# Patient Record
Sex: FEMALE | Race: BLACK OR AFRICAN AMERICAN | NOT HISPANIC OR LATINO | Employment: PART TIME | URBAN - METROPOLITAN AREA
[De-identification: names, ages, dates, MRNs, and addresses within clinical notes are randomized per-mention and may not be internally consistent; named-entity substitution may affect disease eponyms.]

---

## 2017-01-13 ENCOUNTER — ROUTINE PRENATAL (OUTPATIENT)
Dept: OBSTETRICS AND GYNECOLOGY | Facility: CLINIC | Age: 22
End: 2017-01-13
Payer: MEDICAID

## 2017-01-13 VITALS
WEIGHT: 147.69 LBS | DIASTOLIC BLOOD PRESSURE: 60 MMHG | BODY MASS INDEX: 27.02 KG/M2 | SYSTOLIC BLOOD PRESSURE: 100 MMHG

## 2017-01-13 DIAGNOSIS — Z87.51 HISTORY OF PRETERM DELIVERY: ICD-10-CM

## 2017-01-13 DIAGNOSIS — O09.891 HISTORY OF GROUP B STREPTOCOCCUS URINARY TRACT INFECTION, CURRENTLY PREGNANT IN FIRST TRIMESTER: ICD-10-CM

## 2017-01-13 DIAGNOSIS — Z87.440 HISTORY OF GROUP B STREPTOCOCCUS URINARY TRACT INFECTION, CURRENTLY PREGNANT IN FIRST TRIMESTER: ICD-10-CM

## 2017-01-13 DIAGNOSIS — Z34.92 NORMAL PREGNANCY IN SECOND TRIMESTER: Primary | ICD-10-CM

## 2017-01-13 PROCEDURE — 99212 OFFICE O/P EST SF 10 MIN: CPT | Mod: TH,S$PBB,, | Performed by: OBSTETRICS & GYNECOLOGY

## 2017-01-13 PROCEDURE — 99212 OFFICE O/P EST SF 10 MIN: CPT | Mod: PBBFAC | Performed by: OBSTETRICS & GYNECOLOGY

## 2017-01-13 PROCEDURE — 99999 PR PBB SHADOW E&M-EST. PATIENT-LVL II: CPT | Mod: PBBFAC,,, | Performed by: OBSTETRICS & GYNECOLOGY

## 2017-01-13 NOTE — MR AVS SNAPSHOT
[facility] [address]  [address]  [address]  [facility] [address]  Phone: [phone]  Fax: [phone]                  Dannielle De Jesus   2017 2:00 PM   Routine Prenatal    Description:  Female : 1995   Provider:  Jessica Carrillo MD   Department:  [facility] [address]           Reason for Visit     Routine Prenatal Visit           Diagnoses this Visit        Comments    Normal pregnancy in first trimester    -  Primary     History of group B Streptococcus urinary tract infection, currently pregnant in first trimester         History of  delivery                To Do List           Goals (5 Years of Data)     None      [facility]     [facility] Care [facility] -  Assistance  Registered nurses in the [facility] provide clinical advisement, health education, appointment booking, and other advisory services.  Call for this free service at [phone].             Medications           Message regarding Medications     Verify the changes and/or additions to your medication regime listed below are the same as discussed with your clinician today.  If any of these changes or additions are incorrect, please notify your healthcare provider.             Verify that the below list of medications is an accurate representation of the medications you are currently taking.  If none reported, the list may be blank. If incorrect, please contact your healthcare provider. Carry this list with you in case of emergency.           Current Medications     ondansetron (ZOFRAN-ODT) 4 MG TbDL PLACE 1 TABLET UNDER TONGUE AND LET DISSOLVE EVERY 6 HOURS AS NEEDED FOR NAUSEA AND VOMITING           Clinical Reference Information           Prenatal Vitals     Enc. Date GA Prenatal Vitals Prenatal Pulse Pain Level Urine Albumin/Glucose Edema Presentation Dilation/Effacement/Station    17 23w4d 100/60 / 67 kg (147 lb 11.3 oz)  /  / Present  0 Negative /  "Negative None / None      16 17w4d 100/60 / 65 kg (143 lb 4.8 oz)  / +++ / Present  0 Negative / Negative None / None      16 16w0d 90/60 / 65 kg (143 lb 4.8 oz)  / +++  0 Negative / Negative None / None      10/25/16 12w1d 100/60 / 63 kg (138 lb 14.2 oz)  / +++  0 Negative / Negative None / None      16 8w1d 110/70 / 66 kg (145 lb 8.1 oz)              TW.228 kg (2 lb 11.3 oz)   Pregravid weight: 65.8 kg (145 lb)   Number of fetuses: 1   Height: 5' 3" (1.6 m)   BMI: 25.7       Vital Signs - Last Recorded  Most recent update: 2017  2:22 PM by Natty PINEDA Wt LMP BMI       100/60 67 kg (147 lb 11.3 oz) 2016 27.02 kg/m2       Allergies as of 2017     No Known Allergies      Immunizations Administered on Date of Encounter - 2017     None      "

## 2017-01-17 ENCOUNTER — LAB VISIT (OUTPATIENT)
Dept: LAB | Facility: OTHER | Age: 22
End: 2017-01-17
Attending: OBSTETRICS & GYNECOLOGY
Payer: MEDICAID

## 2017-01-17 DIAGNOSIS — Z34.92 NORMAL PREGNANCY IN SECOND TRIMESTER: ICD-10-CM

## 2017-01-17 LAB
BASOPHILS # BLD AUTO: 0.02 K/UL
BASOPHILS NFR BLD: 0.2 %
DIFFERENTIAL METHOD: ABNORMAL
EOSINOPHIL # BLD AUTO: 0.1 K/UL
EOSINOPHIL NFR BLD: 0.9 %
ERYTHROCYTE [DISTWIDTH] IN BLOOD BY AUTOMATED COUNT: 13.1 %
GLUCOSE SERPL-MCNC: 98 MG/DL
HCT VFR BLD AUTO: 31.6 %
HGB BLD-MCNC: 10.5 G/DL
LYMPHOCYTES # BLD AUTO: 1.4 K/UL
LYMPHOCYTES NFR BLD: 17.7 %
MCH RBC QN AUTO: 27 PG
MCHC RBC AUTO-ENTMCNC: 33.2 %
MCV RBC AUTO: 81 FL
MONOCYTES # BLD AUTO: 0.4 K/UL
MONOCYTES NFR BLD: 5.3 %
NEUTROPHILS # BLD AUTO: 6.1 K/UL
NEUTROPHILS NFR BLD: 75.7 %
PLATELET # BLD AUTO: 303 K/UL
PMV BLD AUTO: 9.4 FL
RBC # BLD AUTO: 3.89 M/UL
WBC # BLD AUTO: 8.08 K/UL

## 2017-01-17 PROCEDURE — 36415 COLL VENOUS BLD VENIPUNCTURE: CPT

## 2017-01-17 PROCEDURE — 82950 GLUCOSE TEST: CPT

## 2017-01-17 PROCEDURE — 85025 COMPLETE CBC W/AUTO DIFF WBC: CPT

## 2017-01-21 ENCOUNTER — PATIENT MESSAGE (OUTPATIENT)
Dept: OBSTETRICS AND GYNECOLOGY | Facility: CLINIC | Age: 22
End: 2017-01-21

## 2017-01-23 ENCOUNTER — TELEPHONE (OUTPATIENT)
Dept: OBSTETRICS AND GYNECOLOGY | Facility: CLINIC | Age: 22
End: 2017-01-23

## 2017-01-23 NOTE — TELEPHONE ENCOUNTER
I spoke with patient and told her per Dr Carrillo that she can take the cranberry tablets. Patient verbalized understanding

## 2017-01-30 ENCOUNTER — HOSPITAL ENCOUNTER (OUTPATIENT)
Facility: OTHER | Age: 22
Discharge: HOME | End: 2017-01-31
Attending: OBSTETRICS & GYNECOLOGY | Admitting: OBSTETRICS & GYNECOLOGY
Payer: MEDICAID

## 2017-01-30 ENCOUNTER — TELEPHONE (OUTPATIENT)
Dept: OBSTETRICS AND GYNECOLOGY | Facility: CLINIC | Age: 22
End: 2017-01-30

## 2017-01-30 DIAGNOSIS — O09.891 HISTORY OF GROUP B STREPTOCOCCUS URINARY TRACT INFECTION, CURRENTLY PREGNANT IN FIRST TRIMESTER: ICD-10-CM

## 2017-01-30 DIAGNOSIS — Z87.51 HISTORY OF PRETERM DELIVERY: ICD-10-CM

## 2017-01-30 DIAGNOSIS — O47.02 THREATENED PRETERM LABOR, SECOND TRIMESTER: ICD-10-CM

## 2017-01-30 DIAGNOSIS — Z87.440 HISTORY OF GROUP B STREPTOCOCCUS URINARY TRACT INFECTION, CURRENTLY PREGNANT IN FIRST TRIMESTER: ICD-10-CM

## 2017-01-30 DIAGNOSIS — O47.00 PRETERM UTERINE CONTRACTIONS: Primary | ICD-10-CM

## 2017-01-30 DIAGNOSIS — O26.872 SHORT CERVIX DURING PREGNANCY IN SECOND TRIMESTER: ICD-10-CM

## 2017-01-30 LAB
ABO + RH BLD: NORMAL
BASOPHILS # BLD AUTO: 0.01 K/UL
BASOPHILS NFR BLD: 0.1 %
BILIRUB SERPL-MCNC: NORMAL MG/DL
BLD GP AB SCN CELLS X3 SERPL QL: NORMAL
BLOOD URINE, POC: NORMAL
COLOR, POC UA: YELLOW
DIFFERENTIAL METHOD: ABNORMAL
EOSINOPHIL # BLD AUTO: 0.1 K/UL
EOSINOPHIL NFR BLD: 0.7 %
ERYTHROCYTE [DISTWIDTH] IN BLOOD BY AUTOMATED COUNT: 13.1 %
FIBRONECTIN FETAL SPEC QL: POSITIVE
GLUCOSE UR QL STRIP: NORMAL
HCT VFR BLD AUTO: 29.8 %
HGB BLD-MCNC: 10 G/DL
KETONES UR QL STRIP: NORMAL
LEUKOCYTE ESTERASE URINE, POC: NORMAL
LYMPHOCYTES # BLD AUTO: 1.8 K/UL
LYMPHOCYTES NFR BLD: 15.7 %
MCH RBC QN AUTO: 27.4 PG
MCHC RBC AUTO-ENTMCNC: 33.6 %
MCV RBC AUTO: 82 FL
MONOCYTES # BLD AUTO: 0.9 K/UL
MONOCYTES NFR BLD: 8.3 %
NEUTROPHILS # BLD AUTO: 8.3 K/UL
NEUTROPHILS NFR BLD: 75 %
NITRITE, POC UA: NORMAL
PH, POC UA: NORMAL
PLATELET # BLD AUTO: 265 K/UL
PMV BLD AUTO: 9 FL
PROTEIN, POC: NORMAL
RBC # BLD AUTO: 3.65 M/UL
SPECIFIC GRAVITY, POC UA: NORMAL
UROBILINOGEN, POC UA: NORMAL
WBC # BLD AUTO: 11.12 K/UL

## 2017-01-30 PROCEDURE — 86901 BLOOD TYPING SEROLOGIC RH(D): CPT

## 2017-01-30 PROCEDURE — 86900 BLOOD TYPING SEROLOGIC ABO: CPT

## 2017-01-30 PROCEDURE — 82731 ASSAY OF FETAL FIBRONECTIN: CPT

## 2017-01-30 PROCEDURE — G0378 HOSPITAL OBSERVATION PER HR: HCPCS

## 2017-01-30 PROCEDURE — 99285 EMERGENCY DEPT VISIT HI MDM: CPT | Mod: 25,,, | Performed by: OBSTETRICS & GYNECOLOGY

## 2017-01-30 PROCEDURE — 85025 COMPLETE CBC W/AUTO DIFF WBC: CPT

## 2017-01-30 PROCEDURE — 99285 EMERGENCY DEPT VISIT HI MDM: CPT | Mod: 25

## 2017-01-30 PROCEDURE — 59025 FETAL NON-STRESS TEST: CPT

## 2017-01-30 PROCEDURE — 59025 FETAL NON-STRESS TEST: CPT | Mod: 26,,, | Performed by: OBSTETRICS & GYNECOLOGY

## 2017-01-30 PROCEDURE — 76805 OB US >/= 14 WKS SNGL FETUS: CPT | Mod: 26,,, | Performed by: OBSTETRICS & GYNECOLOGY

## 2017-01-30 RX ORDER — AMOXICILLIN 250 MG
1 CAPSULE ORAL NIGHTLY PRN
Status: DISCONTINUED | OUTPATIENT
Start: 2017-01-30 | End: 2017-01-31 | Stop reason: HOSPADM

## 2017-01-30 RX ORDER — SIMETHICONE 80 MG
1 TABLET,CHEWABLE ORAL EVERY 6 HOURS PRN
Status: DISCONTINUED | OUTPATIENT
Start: 2017-01-30 | End: 2017-01-31 | Stop reason: HOSPADM

## 2017-01-30 RX ORDER — DIPHENHYDRAMINE HYDROCHLORIDE 50 MG/ML
25 INJECTION INTRAMUSCULAR; INTRAVENOUS EVERY 4 HOURS PRN
Status: DISCONTINUED | OUTPATIENT
Start: 2017-01-30 | End: 2017-01-31 | Stop reason: HOSPADM

## 2017-01-30 RX ORDER — DIPHENHYDRAMINE HCL 25 MG
25 CAPSULE ORAL EVERY 4 HOURS PRN
Status: DISCONTINUED | OUTPATIENT
Start: 2017-01-30 | End: 2017-01-31 | Stop reason: HOSPADM

## 2017-01-30 RX ORDER — ONDANSETRON 4 MG/1
8 TABLET, ORALLY DISINTEGRATING ORAL EVERY 8 HOURS PRN
Status: DISCONTINUED | OUTPATIENT
Start: 2017-01-30 | End: 2017-01-31 | Stop reason: HOSPADM

## 2017-01-30 RX ORDER — PRENATAL WITH FERROUS FUM AND FOLIC ACID 3080; 920; 120; 400; 22; 1.84; 3; 20; 10; 1; 12; 200; 27; 25; 2 [IU]/1; [IU]/1; MG/1; [IU]/1; MG/1; MG/1; MG/1; MG/1; MG/1; MG/1; UG/1; MG/1; MG/1; MG/1; MG/1
1 TABLET ORAL DAILY
Status: DISCONTINUED | OUTPATIENT
Start: 2017-01-31 | End: 2017-01-31 | Stop reason: HOSPADM

## 2017-01-30 RX ORDER — BETAMETHASONE SODIUM PHOSPHATE AND BETAMETHASONE ACETATE 3; 3 MG/ML; MG/ML
12 INJECTION, SUSPENSION INTRA-ARTICULAR; INTRALESIONAL; INTRAMUSCULAR; SOFT TISSUE ONCE
Status: DISCONTINUED | OUTPATIENT
Start: 2017-01-30 | End: 2017-01-30

## 2017-01-30 NOTE — H&P
History          Chief Complaint   Patient presents with    Contractions      Review of patient's allergies indicates:  No Known Allergies  HPI Comments: Dannielle De Jesus is a 21 y.o. [id number] at 26w0d presents complaining of contractions.      This IUP is complicated by history of PTD @ 36w. Currently on Muscoda weekly (R or F each week). Also with GBS UTI this pregnancy (will need PCN ppx during labor). Patient reports contractions, denies vaginal bleeding, denies LOF. Fetal Movement: normal.      The history is provided by the patient.      No past medical history on file.       Past Medical History Pertinent Negatives   Diagnosis Date Noted    Asthma 2015    Diabetes mellitus 2015    Hypertension 2015      No past surgical history on file.  History reviewed. No pertinent family history.       Social History   Substance Use Topics    Smoking status: Never Smoker    Smokeless tobacco: None    Alcohol use Yes      Review of Systems   Constitutional: Negative for fever.   HENT: Negative for sore throat.   Respiratory: Negative for shortness of breath.   Cardiovascular: Negative for chest pain.   Gastrointestinal: Negative for abdominal pain, constipation, diarrhea, nausea and vomiting.   Genitourinary: Negative for dysuria, vaginal bleeding, vaginal discharge and vaginal pain.   Musculoskeletal: Negative for back pain.   Skin: Negative for rash.   Neurological: Negative for dizziness, weakness, light-headedness and headaches.   Hematological: Does not bruise/bleed easily.                 Physical Exam          Initial Vitals   BP Pulse Resp Temp SpO2   17 1001 17 1001 17 1001 17 1000 17 1001   108/61 86 16 97.3 °F (36.3 °C) 100 %      Physical Exam  Nursing note and vitals reviewed.  Constitutional: She appears well-developed and well-nourished. She is not diaphoretic. No distress.   HENT:   Head: Normocephalic and atraumatic.   Eyes: Conjunctivae and EOM are  normal.   Neck: Normal range of motion.   Cardiovascular: Normal rate.   Pulmonary/Chest: No respiratory distress.   Musculoskeletal: Normal range of motion.   Neurological: She is alert and oriented to person, place, and time.   Skin: Skin is warm and dry.   Psychiatric: She has a normal mood and affect.   OB LABOR EXAM:      Membranes ruptured: No.   Method: Sterile speculum exam per MD.   Vaginal Bleeding: none present.      Dilatation: 0.      Amniotic Fluid Color: no fluid.      Comments: FHT: reassuring, 140 bpm, mod BTB variability, + accels, - decels  TOCO: difficult to determine on TOCO, but few irregular contractions  Cervix: closed, -3, medium consistency, posterior; Fung 2         ED Course   Procedures        Labs Reviewed   FETAL FIBRONECTIN - Abnormal; Notable for the following:    Result Value      Fetal Fibronectin Positive (*)       All other components within normal limits   POCT URINALYSIS, DIPSTICK OR TABLET REAGENT, AUTOMATED, WITH MICROSCOP            Medical Decision Making:   Initial Assessment:   Dannielle De Jesus is a 21 y.o. With history of PTD at 36 weeks, currently on sunni injections weekly.  Differential Diagnosis:   Threatened  labor   contractions     Clinical Tests:   Lab Tests: Ordered and Reviewed  <> Summary of Lab: FFN positive  ED Management:  History of  labor at 36w:  - PNT with ctx q2-3 minutes  - now in KIRTI ctx spaced to >7 minutes  - MFM TVUS for cervical length: 2.33 cm in length  - FFN is positive  - After 2 hours of monitoring patient is no longer ora on TOCO or feeling ctx  - Case discussed with MFM on call: will admit to Observation and reassess this evening v tomorrow  - Holding BMZ unless patient begins ora/threatens labor     Plan following discharge from this hospitalization:  - RTC for Sunni weekly as scheduled  - RTC for prenatal care as scheduled  - no s/s/ of PTL at this time  - RT KIRTI for ctx, decreased FM,  vaginal bleeding, LOF  - Pt will be given routine pregnancy instructions including to return to triage if she had any vaginal bleeding (other than spotting for the next 48hrs), any loss of fluid like her water broke, decreased fetal movement, or contractions Q 5min lasting for 2 or more hours. Pt will be also instructed to drink copious water.      Jeni Mei MD, PhD  OBGYN, PGY-1     Other:   I have discussed this case with another health care provider.  <> Summary of the Discussion: Plan discussed with staff physician Dr. Suárez                    ED Course      Clinical Impression:   The primary encounter diagnosis was  uterine contractions. Diagnoses of History of  delivery and History of GBS UTI at outside facility; PCN in labor were also pertinent to this visit.        Jeni Mei MD  Resident  [phone]

## 2017-01-30 NOTE — TELEPHONE ENCOUNTER
----- Message from Karolinamiroslava Hardy sent at 1/30/2017  8:20 AM CST -----  Contact: ALEXIS COFFMAN [[id number]]  _x  1st Request  _  2nd Request  _  3rd Request        Who: ALEXIS COFFMAN [[id number]]    Why: patient states 26 wks she thinks she is having lianet mccarty contractions and would like a call back from the staff to discuss.     What Number to Call Back: [phone]    When to Expect a call back: (Before the end of the day)   -- if call after 3:00 call back will be tomorrow.

## 2017-01-30 NOTE — IP AVS SNAPSHOT
[facility] ([facility])  [address] [place], [place] [id number]  Phone: [phone]           Patient Discharge Instructions     Our goal is to set you up for success. This packet includes information on your condition, medications, and your home care. It will help you to care for yourself so you don't get sicker and need to go back to the hospital.     Please ask your nurse if you have any questions.        There are many details to remember when preparing to leave the hospital. Here is what you will need to do:    1. Take your medicine. If you are prescribed medications, review your Medication List in the following pages. You may have new medications to  at the pharmacy and others that you'll need to stop taking. Review the instructions for how and when to take your medications. Talk with your doctor or nurses if you are unsure of what to do.     2. Go to your follow-up appointments. Specific follow-up information is listed in the following pages. Your may be contacted by a transition nurse or clinical provider about future appointments. Be sure we have all of the phone numbers to reach you, if needed. Please contact your provider's office if you are unable to make an appointment.     3. Watch for warning signs. Your doctor or nurse will give you detailed warning signs to watch for and when to call for assistance. These instructions may also include educational information about your condition. If you experience any of warning signs to your health, call your doctor.               [facility]  Unless otherwise directed by your provider, please contact [facility], our nurse care line that is available for 24/7 assistance.     [phone] (toll-free)    Registered nurses in the [facility] provide clinical advisement, health education, appointment booking, and other advisory services.                    ** Verify the list of medication(s) below is accurate and up to  date. Carry this with you in case of emergency. If your medications have changed, please notify your healthcare provider.             Medication List      CONTINUE taking these medications        Additional Info                      prenatal #108-iron,carbonyl-FA 30-1 mg Tab   Refills:  0    Instructions:  Take by mouth once daily.     Begin Date    AM    Noon    PM    Bedtime                  Please bring to all follow up appointments:    1. A copy of your discharge instructions.  2. All medicines you are currently taking in their original bottles.  3. Identification and insurance card.    Please arrive 15 minutes ahead of scheduled appointment time.    Please call 24 hours in advance if you must reschedule your appointment and/or time.        Your Scheduled Appointments     Feb 10, 2017  3:00 PM CST   Routine Prenatal Visit with Jessica Carrillo MD   [facility] [address] [facility] ([facility])    [address] [place] [address] [phone]              Follow-up Information     Follow up with Jessica Carrillo MD.    Specialty:  Obstetrics and Gynecology    Why:  prenatal appointment as scheduled    Contact information:    [address] [place] [address] [phone]          Discharge Instructions     Future Orders    Activity as tolerated     Call MD for:  difficulty breathing or increased cough     Call MD for:  increased confusion or weakness     Call MD for:  persistent dizziness, light-headedness, or visual disturbances     Call MD for:  persistent nausea and vomiting or diarrhea     Call MD for:  severe persistent headache     Call MD for:  severe uncontrolled pain     Call MD for:  temperature >100.4     Call MD for:     Comments:    Leakage of fluid, decreased fetal movement, vaginal bleeding, contractions every 3-5 minutes    Diet general     Questions:    Total calories:      Fat restriction, if any:      Protein restriction, if any:      Na restriction, if any:      Fluid  restriction:      Additional restrictions:          Discharge Instructions       Call clinic [phone] or L & D after hours at [phone] for vaginal bleeding, leakage of fluids, contractions 4-5 in one hour, decreased fetal movements (10 kicks in 2 hours), headache not relieved by Tylenol, blurry vision, or temp of 100.4 or greater.  Begin doing fetal kick counts, at least 10 movements in 2 hours starting at 28 weeks gestation.  Keep next clinic appointment.      Premature Labor    Premature labor (also called  labor) is when symptoms of labor occur before 37 weeks of pregnancy. (This is 3 weeks before your due date.) Premature labor can lead to premature delivery. This means giving birth to your baby early. Babies need at least 37 weeks of pregnancy for all the organs to develop normally. The earlier the delivery, the greater the risks to the baby.  In most cases, the cause of premature labor is unknown. But certain factors may make the problem more likely. These include:  · History of premature labor with other pregnancies  · Smoking  · Alcohol or substance abuse  · Low pre-pregnancy weight or weight gain during pregnancy  · Short time period between pregnancies  · Being pregnant with twins, triplets, or more  · History of certain types of surgery on the cervix or uterus  · Having a short cervix  · Certain infections  There are a number of other risk factors. Ask your healthcare provider to help you understand the risk factors specific to your case. Then find out what you can do to control or reduce them.  Contractions are one of the main signs of premature labor. A contraction is different from cramping. It may feel painful and the belly (abdomen) may get hard. It can last from a few seconds to a few minutes. Some women may feel only a sense of pressure in the belly, thighs, rectum, or vagina. Some may feel only the hardening of the uterus without pain or pressure. Or there may be a constant pain the lower  back, which spreads forward toward the belly.    Premature labor can often be treated with medicines. A hospital stay will likely be needed. If labor is stopped successfully and you and your baby are both healthy, you may be discharged to continue care at home.  Home care  · Ask your provider any questions you have. Be certain you understand how to care for yourself at home. Also follow all recommendations given by your healthcare providers.  · Learn the signs of premature labor. Watch for these signs when you get home.  · Limit or restrict activities as advised. This may include stopping certain physical activities and cutting back hours at work.  · Avoid doing any strenuous work. Ask family and friends for help with tasks and support at home, if needed.  · Dont smoke, drink alcohol, or use other harmful substances.  · Take steps to reduce stress.  · Report any unusual symptoms to your provider.  Follow-up care  Follow up with your healthcare provider, or as directed. Weekly visits with your provider may be needed.  When to seek medical advice  Call your healthcare provider right away if any of these occur:  · Regular or frequent contractions, whether they are painful or not  · Pressure in the pelvis  · Pressure in the lower belly or mild cramping in your belly with or without diarrhea  · Constant low, dull backache  · Gush or slow leaking of water from your vagina  · Change in vaginal discharge (watery, mucus, or bloody)  · Any vaginal bleeding  · Decreased movement of your baby  © 9263-4129 [employer]. [address] [place] [address], [place] [address]. All rights reserved. This information is not intended as a substitute for professional medical care. Always follow your healthcare professional's instructions.            Primary Diagnosis     Your primary diagnosis was:  Threatened Premature Labor      Admission Information     Date & Time Provider Department CSN    1/30/2017  9:45 AM Jessica Carrillo  "MD [facility] [id number]      Care Providers     Provider Role Specialty Primary office phone    Jessica Carrillo MD Attending Provider Obstetrics and Gynecology [phone]      Your Vitals Were     BP Pulse Temp Resp Height Weight    106/68 82 98 °F (36.7 °C) (Oral) 18 5' 3" (1.6 m) 65.8 kg (145 lb)    Last Period SpO2 BMI          08/01/2016 100% 25.69 kg/m2        Recent Lab Values     No lab values to display.      Allergies as of 1/31/2017     No Known Allergies      Advance Directives     An advance directive is a document which, in the event you are no longer able to make decisions for yourself, tells your healthcare team what kind of treatment you do or do not want to receive, or who you would like to make those decisions for you.  If you do not currently have an advance directive, [facility] encourages you to create one.  For more information call:  [phone]), [phone] ([phone]),  or log on to [email or web address].        Language Assistance Services     ATTENTION: Language assistance services are available, free of charge. Please call [phone].      ATENCIÓN: Si habla español, tiene a kwong disposición servicios gratuitos de asistencia lingüística. Llame al [phone].     CHÚ Ý: N?u b?n nói Ti?ng Vi?t, có các d?ch v? h? tr? ngôn ng? mi?n phí dành cho b?n. G?i s? [phone].         [facility] complies with applicable Federal civil rights laws and does not discriminate on the basis of race, color, national origin, age, disability, or sex.        "

## 2017-01-30 NOTE — LETTER
January 31, 2017         [address]  [place] [address]  Phone: [phone]       Patient: Dannielle De Jesus   YOB: 1995  Date of Visit: 01/31/2017    To Whom It May Concern:    Dannielle was at [facility] from 1/30/2017 through 1/31/2017. She may return to work on 2/7/2017 with no restrictions. Please excuse her from any work missed from 1/30/2017 through 2/6/2017. If you have any questions or concerns, or if I can be of further assistance, please do not hesitate to contact me.    Sincerely,      Vivian Scott MD

## 2017-01-30 NOTE — TELEPHONE ENCOUNTER
I spoke with patient and she said her contractions are minutes apart she's heading to [place] right now.

## 2017-01-30 NOTE — ED PROVIDER NOTES
Encounter Date: 2017       History     Chief Complaint   Patient presents with    Contractions     Review of patient's allergies indicates:  No Known Allergies  HPI Comments: Dannielle De Jesus is a 21 y.o. [id number] at 26w0d presents complaining of contractions.     This IUP is complicated by history of PTD @ 36w. Currently on [place] weekly (R or F each week). Also with GBS UTI this pregnancy (will need PCN ppx during labor).  Patient reports contractions, denies vaginal bleeding, denies LOF.   Fetal Movement: normal.     The history is provided by the patient.     No past medical history on file.  Past Medical History Pertinent Negatives   Diagnosis Date Noted    Asthma 2015    Diabetes mellitus 2015    Hypertension 2015     No past surgical history on file.  History reviewed. No pertinent family history.  Social History   Substance Use Topics    Smoking status: Never Smoker    Smokeless tobacco: None    Alcohol use Yes     Review of Systems   Constitutional: Negative for fever.   HENT: Negative for sore throat.    Respiratory: Negative for shortness of breath.    Cardiovascular: Negative for chest pain.   Gastrointestinal: Negative for abdominal pain, constipation, diarrhea, nausea and vomiting.   Genitourinary: Negative for dysuria, vaginal bleeding, vaginal discharge and vaginal pain.   Musculoskeletal: Negative for back pain.   Skin: Negative for rash.   Neurological: Negative for dizziness, weakness, light-headedness and headaches.   Hematological: Does not bruise/bleed easily.       Physical Exam   Initial Vitals   BP Pulse Resp Temp SpO2   17 1001 17 1001 17 1001 17 1000 17 1001   108/61 86 16 97.3 °F (36.3 °C) 100 %     Physical Exam    Nursing note and vitals reviewed.  Constitutional: She appears well-developed and well-nourished. She is not diaphoretic. No distress.   HENT:   Head: Normocephalic and atraumatic.   Eyes: Conjunctivae and EOM are  normal.   Neck: Normal range of motion.   Cardiovascular: Normal rate.   Pulmonary/Chest: No respiratory distress.   Musculoskeletal: Normal range of motion.   Neurological: She is alert and oriented to person, place, and time.   Skin: Skin is warm and dry.   Psychiatric: She has a normal mood and affect.     OB LABOR EXAM:     Membranes ruptured: No.   Method: Sterile speculum exam per MD.   Vaginal Bleeding: none present.     Dilatation: 0.     Amniotic Fluid Color: no fluid.     Comments: FHT: reassuring, 140 bpm, mod BTB variability, + accels, - decels  TOCO: difficult to determine on TOCO, but few irregular contractions  Cervix: closed, -3, medium consistency, posterior; Fung 2       ED Course   Procedures  Labs Reviewed   FETAL FIBRONECTIN - Abnormal; Notable for the following:        Result Value    Fetal Fibronectin Positive (*)     All other components within normal limits   POCT URINALYSIS, DIPSTICK OR TABLET REAGENT, AUTOMATED, WITH MICROSCOP           Medical Decision Making:   Initial Assessment:   Dannielle De Jesus is a 21 y.o. With history of PTD at 36 weeks, currently on robin injections weekly.  Differential Diagnosis:   Threatened  labor   contractions    Clinical Tests:   Lab Tests: Ordered and Reviewed       <> Summary of Lab: FFN positive  ED Management:  History of  labor at 36w:  - PNT with ctx q2-3 minutes  - now in KIRTI ctx spaced to >7 minutes  - MFM TVUS for cervical length: 2.33 cm in length  - FFN is positive  - After 2 hours of monitoring patient is no longer ora on TOCO or feeling ctx  - Case discussed with MFM on call: will admit to Observation and reassess this evening v tomorrow  - Holding BMZ unless patient begins ora/threatens labor    Plan following discharge from this hospitalization:  - RTC for [place] weekly as scheduled  - RTC for prenatal care as scheduled  - no s/s/ of PTL at this time  - RT KIRTI for ctx, decreased FM, vaginal  bleeding, LOF  - Pt will be given routine pregnancy instructions including to return to triage if she had any vaginal bleeding (other than spotting for the next 48hrs), any loss of fluid like her water broke, decreased fetal movement, or contractions Q 5min  lasting for 2 or more hours. Pt will be also instructed to drink copious water.     Jeni Mei MD, PhD  OBGYN, PGY-1    Other:   I have discussed this case with another health care provider.       <> Summary of the Discussion: Plan discussed with staff physician Dr. Suárez              Attending Attestation:   Physician Attestation Statement for Resident:  As the supervising MD   Physician Attestation Statement: I have personally seen and examined this patient.   I agree with the above history. -:   As the supervising MD I agree with the above PE.    As the supervising MD I agree with the above treatment, course, plan, and disposition.   -:   NST  I independently reviewed the fetal non-stress test with the following interpretation:    140 BPM baseline    Variability: moderate    Accelerations: present    Decelerations: variable x1    Contractions: occasional    Category 1    Clinical Interpretation: age appropriate    Patient evaluated and found to be stable, agree with resident's assessment and plan to admit for observation and consideration of steroids.  I was personally present during the critical portions of the procedure(s) performed by the resident and was immediately available in the ED to provide services and assistance as needed during the entire procedure.  I have reviewed the following: old records at this facility.                    ED Course     Clinical Impression:   The primary encounter diagnosis was  uterine contractions. Diagnoses of History of  delivery and History of GBS UTI at outside facility; PCN in labor were also pertinent to this visit.          Jeni Mei MD  Resident  [phone]       Sherron SMALL  MD Kamini  01/30/17 3662

## 2017-01-31 VITALS
WEIGHT: 145 LBS | TEMPERATURE: 98 F | HEIGHT: 63 IN | DIASTOLIC BLOOD PRESSURE: 68 MMHG | RESPIRATION RATE: 18 BRPM | SYSTOLIC BLOOD PRESSURE: 106 MMHG | OXYGEN SATURATION: 100 % | HEART RATE: 82 BPM | BODY MASS INDEX: 25.69 KG/M2

## 2017-01-31 PROCEDURE — 99224 PR SUBSEQUENT OBSERVATION CARE,LEVEL I: CPT | Mod: ,,, | Performed by: OBSTETRICS & GYNECOLOGY

## 2017-01-31 PROCEDURE — G0378 HOSPITAL OBSERVATION PER HR: HCPCS

## 2017-01-31 NOTE — DISCHARGE SUMMARY
[facility]  Discharge Summary  Gynecology      Admit Date: 2017    Discharge Date and Time: 2017 4:58 AM    Attending Physician: Jessica Carrillo MD ; Pasha De Jesus MD    Discharge Provider: Nathalie Herrera    Reason for Admission: Threatened  labor in the setting of h/o PTD    Procedures Performed: * No surgery found *    Hospital Course: Dannielle De Jesus is a 21 y.o. female at 26w1d who is admitted for monitoring 2/2 threatened  labor in the setting of h/o PTD @ 36wga ([place] weekly). FFN was positive. Patient was acontractile on toco prior to admission. MFM U/S noted cervical length of 2.33cm. HD#2, patient denied further contractions since admission and her cervical exam was unchanged (closed). Patient was subsequently discharged home with PTL precautions and instructions to return to clinic for PNC and [place] as scheduled.    Consults: none    Significant Diagnostic Studies: FFN+, TVUS w CL 2.33cm    Final Diagnoses:   Principal Problem: Threatened  labor   Secondary Diagnoses:   Active Hospital Problems    Diagnosis  POA    *Threatened  labor [O47.00]  Yes     uterine contractions [O47.9]  Yes      Resolved Hospital Problems    Diagnosis Date Resolved POA   No resolved problems to display.       Discharged Condition: good    Disposition: Home or Self Care    Follow Up/Patient Instructions:     Medications:  Reconciled Home Medications:   Current Discharge Medication List      CONTINUE these medications which have NOT CHANGED    Details   prenatal #108-iron,carbonyl-FA 30-1 mg Tab Take by mouth once daily.             Discharge Procedure Orders  Diet general     Activity as tolerated     Call MD for:  temperature >100.4     Call MD for:  persistent nausea and vomiting or diarrhea     Call MD for:  severe uncontrolled pain     Call MD for:  difficulty breathing or increased cough     Call MD for:  severe persistent headache     Call  MD for:  persistent dizziness, light-headedness, or visual disturbances     Call MD for:  increased confusion or weakness     Call MD for:   Order Comments: Leakage of fluid, decreased fetal movement, vaginal bleeding, contractions every 3-5 minutes       Follow-up Information     Follow up with Jessica Carrillo MD.    Specialty:  Obstetrics and Gynecology    Why:  prenatal appointment as scheduled    Contact information:    [address] [facility] [address]  [phone]          Denise Bradley MD  PGY-2 OB/GYN  [phone]    Nathalie Herrera M.D.  PGY-2 ObGyn  [phone]

## 2017-01-31 NOTE — PROGRESS NOTES
PROGRESS NOTE  ANTEPARTUM    Admit Date: 2017   LOS: 0 days     Reason for Admission:  Threatened  labor    SUBJECTIVE:     Dannielle De Jesus is a 21 y.o. female at 26w1d who is admitted for monitoring 2/2 threatened  labor in the setting of h/o PTD @ 36wga ([place] weekly).  This morning, patient reports resolution of contractions since admission. Patient denies contractions, loss of fluid, vaginal movement. Reports active fetal movement.    Scheduled Meds:   prenatal vitamin  1 tablet Oral Daily     Continuous Infusions:   PRN Meds:diphenhydrAMINE, diphenhydrAMINE, ondansetron, promethazine (PHENERGAN) IVPB, senna-docusate 8.6-50 mg, simethicone    Review of patient's allergies indicates:  No Known Allergies    OBJECTIVE:     Vital Signs (Most Recent)  Temp: 98.7 °F (37.1 °C) (17 2355)  Pulse: 81 (17 2355)  Resp: 18 (17 1615)  BP: 106/62 (17 2355)  SpO2: 100 % (17 2355)    Temperature Range Min/Max (Last 24H):  Temp:  [86.6 °F (30.3 °C)-98.7 °F (37.1 °C)]     Vital Signs Range (Last 24H):  Temp:  [86.6 °F (30.3 °C)-98.7 °F (37.1 °C)]   Pulse:  []   Resp:  [16-18]   BP: ()/(50-64)   SpO2:  [95 %-100 %]     I & O (Last 24H):No intake or output data in the 24 hours ending 17 5083    Physical Exam:  General: well developed, well nourished  Lungs:  normal respiratory effort and no increased work of breathing  Heart: regular rate  Abdomen: soft, nontender, nondistended  Pelvic:  closed, -3, medium consistency, posterior, Fung 2    Patient not yet monitored this morning      ASSESSMENT/PLAN:     Active Hospital Problems    Diagnosis  POA    *Threatened  labor [O47.00]  Yes     uterine contractions [O47.9]  Yes      Resolved Hospital Problems    Diagnosis Date Resolved POA   No resolved problems to display.       Assessment:    21 y.o. female at 26w1d who is admitted for monitoring 2/2 threatened  labor in the setting of h/o PTD  @ 36wga.    Plan:  1. Threatened  labor, h/o PTD   - This morning, patient reports no ctx since admission  - SVE unchanged and closed  - MFM TVUS yesterday for cervical length: 2.33 cm in length  - Will review AM NST/[place] and plan to discharge home today with PTL precautions   - RTC for [place] weekly as scheduled   - RTC for prenatal care as scheduled    Denise Bradley MD  PGY-2 OB/GYN  [phone]

## 2017-01-31 NOTE — PLAN OF CARE
Problem: Patient Care Overview  Goal: Plan of Care Review  Outcome: Ongoing (interventions implemented as appropriate)  Pt doing well, no acute changes during this shift, vital signs stable, no signs of distress, pt report positive fetal movement, pt denies contraction, vaginal bleeding, and LOF. Will continue to monitor patient.

## 2017-01-31 NOTE — DISCHARGE INSTRUCTIONS
Call clinic [phone] or L & D after hours at [phone] for vaginal bleeding, leakage of fluids, contractions 4-5 in one hour, decreased fetal movements (10 kicks in 2 hours), headache not relieved by Tylenol, blurry vision, or temp of 100.4 or greater.  Begin doing fetal kick counts, at least 10 movements in 2 hours starting at 28 weeks gestation.  Keep next clinic appointment.      Premature Labor    Premature labor (also called  labor) is when symptoms of labor occur before 37 weeks of pregnancy. (This is 3 weeks before your due date.) Premature labor can lead to premature delivery. This means giving birth to your baby early. Babies need at least 37 weeks of pregnancy for all the organs to develop normally. The earlier the delivery, the greater the risks to the baby.  In most cases, the cause of premature labor is unknown. But certain factors may make the problem more likely. These include:  · History of premature labor with other pregnancies  · Smoking  · Alcohol or substance abuse  · Low pre-pregnancy weight or weight gain during pregnancy  · Short time period between pregnancies  · Being pregnant with twins, triplets, or more  · History of certain types of surgery on the cervix or uterus  · Having a short cervix  · Certain infections  There are a number of other risk factors. Ask your healthcare provider to help you understand the risk factors specific to your case. Then find out what you can do to control or reduce them.  Contractions are one of the main signs of premature labor. A contraction is different from cramping. It may feel painful and the belly (abdomen) may get hard. It can last from a few seconds to a few minutes. Some women may feel only a sense of pressure in the belly, thighs, rectum, or vagina. Some may feel only the hardening of the uterus without pain or pressure. Or there may be a constant pain the lower back, which spreads forward toward the belly.    Premature labor can often be  treated with medicines. A hospital stay will likely be needed. If labor is stopped successfully and you and your baby are both healthy, you may be discharged to continue care at home.  Home care  · Ask your provider any questions you have. Be certain you understand how to care for yourself at home. Also follow all recommendations given by your healthcare providers.  · Learn the signs of premature labor. Watch for these signs when you get home.  · Limit or restrict activities as advised. This may include stopping certain physical activities and cutting back hours at work.  · Avoid doing any strenuous work. Ask family and friends for help with tasks and support at home, if needed.  · Dont smoke, drink alcohol, or use other harmful substances.  · Take steps to reduce stress.  · Report any unusual symptoms to your provider.  Follow-up care  Follow up with your healthcare provider, or as directed. Weekly visits with your provider may be needed.  When to seek medical advice  Call your healthcare provider right away if any of these occur:  · Regular or frequent contractions, whether they are painful or not  · Pressure in the pelvis  · Pressure in the lower belly or mild cramping in your belly with or without diarrhea  · Constant low, dull backache  · Gush or slow leaking of water from your vagina  · Change in vaginal discharge (watery, mucus, or bloody)  · Any vaginal bleeding  · Decreased movement of your baby  © 5758-1608 [employer]. [address] [place] [address], [place] [address]. All rights reserved. This information is not intended as a substitute for professional medical care. Always follow your healthcare professional's instructions.

## 2017-02-06 ENCOUNTER — TELEPHONE (OUTPATIENT)
Dept: OBSTETRICS AND GYNECOLOGY | Facility: CLINIC | Age: 22
End: 2017-02-06

## 2017-02-06 ENCOUNTER — ROUTINE PRENATAL (OUTPATIENT)
Dept: OBSTETRICS AND GYNECOLOGY | Facility: CLINIC | Age: 22
End: 2017-02-06
Payer: MEDICAID

## 2017-02-06 VITALS
WEIGHT: 152.13 LBS | SYSTOLIC BLOOD PRESSURE: 100 MMHG | BODY MASS INDEX: 26.95 KG/M2 | DIASTOLIC BLOOD PRESSURE: 60 MMHG

## 2017-02-06 DIAGNOSIS — Z87.51 HISTORY OF PRETERM DELIVERY: ICD-10-CM

## 2017-02-06 DIAGNOSIS — J11.1 FLU: ICD-10-CM

## 2017-02-06 DIAGNOSIS — Z34.92 NORMAL PREGNANCY IN SECOND TRIMESTER: Primary | ICD-10-CM

## 2017-02-06 PROCEDURE — 99999 PR PBB SHADOW E&M-EST. PATIENT-LVL II: CPT | Mod: PBBFAC,,, | Performed by: OBSTETRICS & GYNECOLOGY

## 2017-02-06 PROCEDURE — 99213 OFFICE O/P EST LOW 20 MIN: CPT | Mod: TH,S$PBB,, | Performed by: OBSTETRICS & GYNECOLOGY

## 2017-02-06 PROCEDURE — 99212 OFFICE O/P EST SF 10 MIN: CPT | Mod: PBBFAC | Performed by: OBSTETRICS & GYNECOLOGY

## 2017-02-06 RX ORDER — OSELTAMIVIR PHOSPHATE 75 MG/1
75 CAPSULE ORAL 2 TIMES DAILY
Qty: 10 CAPSULE | Refills: 0 | Status: SHIPPED | OUTPATIENT
Start: 2017-02-06 | End: 2017-02-11

## 2017-02-06 NOTE — TELEPHONE ENCOUNTER
----- Message from Natty Rabago sent at 2/6/2017  3:08 PM CST -----  Contact: Self      ----- Message -----     From: Dannielle Pierson     Sent: 2/6/2017   3:02 PM       To: Gerardo CARDOSO Staff    27 week ob pt is calling in regards of stating that she came back positive for the flu. The pt can be reached at [phone]. Thanks KG

## 2017-02-06 NOTE — PROGRESS NOTES
Patient has a sinus infection today.  Reports her whole family has had a viral infection. No fever at home, but has had body aches, headache, sweating in her sleep.  Advised to go to primary care to test for flu; monitor for fever, come to triage if symptoms worsen.   Recently hospitalized for concern for  labor.  FFN was positive.  Discharge home the next day  Positive fetal movement.  No contractions, vaginal bleeding, or loss of fluid.    Tdap next visit.

## 2017-02-06 NOTE — MR AVS SNAPSHOT
[facility] [address]  [address]  [address]  [facility] [place] [address]  Phone: [phone]  Fax: [phone]                  Dannielle De Jesus   2017 11:45 AM   Routine Prenatal    Description:  Female : 1995   Provider:  Jessica Carrillo MD   Department:  [facility] [address]           Reason for Visit     Routine Prenatal Visit                To Do List           Future Appointments        Provider Department Dept Phone    3/2/2017 2:45 PM Jessica Carrillo MD [facility] [address] [phone]      Goals (5 Years of Data)     None      [facility]     [facility] Care [facility] -  Assistance  Registered nurses in the [facility] provide clinical advisement, health education, appointment booking, and other advisory services.  Call for this free service at [phone].             Medications           Message regarding Medications     Verify the changes and/or additions to your medication regime listed below are the same as discussed with your clinician today.  If any of these changes or additions are incorrect, please notify your healthcare provider.             Verify that the below list of medications is an accurate representation of the medications you are currently taking.  If none reported, the list may be blank. If incorrect, please contact your healthcare provider. Carry this list with you in case of emergency.           Current Medications     prenatal #108-iron,carbonyl-FA 30-1 mg Tab Take by mouth once daily.           Clinical Reference Information           Prenatal Vitals     Enc. Date GA Prenatal Vitals Prenatal Pulse Pain Level Urine Albumin/Glucose Edema Presentation Dilation/Effacement/Station    17 27w0d 100/60 / 69 kg (152 lb 1.9 oz)  /  / Present  0 Negative / Negative None / None      17 26w0d Admission Dx:  uterine contractions Dept: [facility]    17 23w4d 100/60 / 67 kg (147 lb 11.3 oz) 22  "cm / +++ / Present  0 Negative / Negative None / None      12/2/16 17w4d 100/60 / 65 kg (143 lb 4.8 oz)  / +++ / Present  0 Negative / Negative None / None      11/21/16 16w0d 90/60 / 65 kg (143 lb 4.8 oz)  / +++  0 Negative / Negative None / None      10/25/16 12w1d 100/60 / 63 kg (138 lb 14.2 oz)  / +++  0 Negative / Negative None / None      9/27/16 8w1d 110/70 / 66 kg (145 lb 8.1 oz)              TWG: 3.228 kg (7 lb 1.9 oz)   Pregravid weight: 65.8 kg (145 lb)   Number of fetuses: 1   Height: 5' 3" (1.6 m)   BMI: 25.7       Your Vitals Were     BP Weight Last Period BMI       100/60 69 kg (152 lb 1.9 oz) 08/01/2016 26.95 kg/m2       Allergies as of 2/6/2017     No Known Allergies      Immunizations Administered on Date of Encounter - 2/6/2017     None      Language Assistance Services     ATTENTION: Language assistance services are available, free of charge. Please call [phone].      ATENCIÓN: Si habla anival, tiene a kwong disposición servicios gratuitos de asistencia lingüística. Llame al [phone].     [facility] Ý: N?u b?n nói Ti?ng Vi?t, có các d?ch v? h? tr? ngôn ng? mi?n phí dành cho b?n. G?i s? [phone].         [facility] [address] complies with applicable Federal civil rights laws and does not discriminate on the basis of race, color, national origin, age, disability, or sex.        "

## 2017-02-09 ENCOUNTER — TELEPHONE (OUTPATIENT)
Dept: OBSTETRICS AND GYNECOLOGY | Facility: CLINIC | Age: 22
End: 2017-02-09

## 2017-02-09 NOTE — TELEPHONE ENCOUNTER
Patient called to follow up.  She reports that she is feeling better from our last appointment on Monday.  She is taking tamiflu and has not had a fever.

## 2017-03-02 ENCOUNTER — ROUTINE PRENATAL (OUTPATIENT)
Dept: OBSTETRICS AND GYNECOLOGY | Facility: CLINIC | Age: 22
End: 2017-03-02
Payer: MEDICAID

## 2017-03-02 VITALS
SYSTOLIC BLOOD PRESSURE: 100 MMHG | BODY MASS INDEX: 27.22 KG/M2 | DIASTOLIC BLOOD PRESSURE: 60 MMHG | WEIGHT: 153.69 LBS

## 2017-03-02 DIAGNOSIS — Z34.93 NORMAL PREGNANCY IN THIRD TRIMESTER: Primary | ICD-10-CM

## 2017-03-02 DIAGNOSIS — N89.8 VAGINAL DISCHARGE: ICD-10-CM

## 2017-03-02 LAB
CANDIDA RRNA VAG QL PROBE: NEGATIVE
G VAGINALIS RRNA GENITAL QL PROBE: POSITIVE
T VAGINALIS RRNA GENITAL QL PROBE: NEGATIVE

## 2017-03-02 PROCEDURE — 99999 PR PBB SHADOW E&M-EST. PATIENT-LVL II: CPT | Mod: PBBFAC,,, | Performed by: OBSTETRICS & GYNECOLOGY

## 2017-03-02 PROCEDURE — 99212 OFFICE O/P EST SF 10 MIN: CPT | Mod: PBBFAC | Performed by: OBSTETRICS & GYNECOLOGY

## 2017-03-02 PROCEDURE — 99213 OFFICE O/P EST LOW 20 MIN: CPT | Mod: TH,S$PBB,, | Performed by: OBSTETRICS & GYNECOLOGY

## 2017-03-02 PROCEDURE — 87480 CANDIDA DNA DIR PROBE: CPT

## 2017-03-02 NOTE — MR AVS SNAPSHOT
[facility] [address]  [address]  [address]  [facility] [address]  Phone: [phone]  Fax: [phone]                  Dannielle De Jesus   3/2/2017 2:45 PM   Routine Prenatal    Description:  Female : 1995   Provider:  Jessica Carrillo MD   Department:  [facility] [address]           Reason for Visit     Routine Prenatal Visit                To Do List           Goals (5 Years of Data)     None      [facility]     [facility] -  Assistance  Registered nurses in the [facility] provide clinical advisement, health education, appointment booking, and other advisory services.  Call for this free service at [phone].             Medications           Message regarding Medications     Verify the changes and/or additions to your medication regime listed below are the same as discussed with your clinician today.  If any of these changes or additions are incorrect, please notify your healthcare provider.             Verify that the below list of medications is an accurate representation of the medications you are currently taking.  If none reported, the list may be blank. If incorrect, please contact your healthcare provider. Carry this list with you in case of emergency.           Current Medications     prenatal #108-iron,carbonyl-FA 30-1 mg Tab Take by mouth once daily.           Clinical Reference Information           Prenatal Vitals     Enc. Date GA Prenatal Vitals Prenatal Pulse Pain Level Urine Albumin/Glucose Edema Presentation Dilation/Effacement/Station    3/2/17 30w3d 100/60 / 69.7 kg (153 lb 10.6 oz)   0 Negative / Negative       17 27w0d 100/60 / 69 kg (152 lb 1.9 oz) 26 cm / +++ / Present  0 Negative / Negative None / None      17 26w0d Admission Dx:  uterine contractions Dept: [facility]    17 23w4d 100/60 / 67 kg (147 lb 11.3 oz) 22 cm / +++ / Present  0 Negative / Negative None / None       "12/2/16 17w4d 100/60 / 65 kg (143 lb 4.8 oz)  / +++ / Present  0 Negative / Negative None / None      11/21/16 16w0d 90/60 / 65 kg (143 lb 4.8 oz)  / +++  0 Negative / Negative None / None      10/25/16 12w1d 100/60 / 63 kg (138 lb 14.2 oz)  / +++  0 Negative / Negative None / None      9/27/16 8w1d 110/70 / 66 kg (145 lb 8.1 oz)              TWG: 3.928 kg (8 lb 10.6 oz)   Pregravid weight: 65.8 kg (145 lb)   Number of fetuses: 1   Height: 5' 3" (1.6 m)   BMI: 25.7       Your Vitals Were     BP                   100/60           Allergies as of 3/2/2017     No Known Allergies      Immunizations Administered on Date of Encounter - 3/2/2017     None      Language Assistance Services     ATTENTION: Language assistance services are available, free of charge. Please call [phone].      ATENCIÓN: Si habla español, tiene a kwong disposición servicios gratuitos de asistencia lingüística. Llame al [phone].     [facility] Ý: N?u b?n nói Ti?ng Vi?t, có các d?ch v? h? tr? ngôn ng? mi?n phí dành cho b?n. G?i s? [phone].         [facility] [address] complies with applicable Federal civil rights laws and does not discriminate on the basis of race, color, national origin, age, disability, or sex.        "

## 2017-03-02 NOTE — PROGRESS NOTES
Positive fetal movement.  No contractions, vaginal bleeding, or loss of fluid.  Occasional contractions.   Some vaginal discharge today.  Vaginosis screen today.    Recently had the flu. Tamiflu treated.  Patient is better but now has a cold.

## 2017-03-03 ENCOUNTER — PATIENT MESSAGE (OUTPATIENT)
Dept: OBSTETRICS AND GYNECOLOGY | Facility: CLINIC | Age: 22
End: 2017-03-03

## 2017-03-03 DIAGNOSIS — N76.0 BACTERIAL VAGINOSIS: Primary | ICD-10-CM

## 2017-03-03 DIAGNOSIS — B96.89 BACTERIAL VAGINOSIS: Primary | ICD-10-CM

## 2017-03-03 RX ORDER — METRONIDAZOLE 500 MG/1
500 TABLET ORAL EVERY 12 HOURS
Qty: 14 TABLET | Refills: 0 | Status: SHIPPED | OUTPATIENT
Start: 2017-03-03 | End: 2017-03-10

## 2017-03-07 ENCOUNTER — PATIENT MESSAGE (OUTPATIENT)
Dept: OBSTETRICS AND GYNECOLOGY | Facility: CLINIC | Age: 22
End: 2017-03-07

## 2017-03-07 NOTE — TELEPHONE ENCOUNTER
Spoke to patient. Pt states that she is having right leg pain. Pt denies swelling, tenderness to touch, chills, fever. Pt states heating pad is not working. Advised pt to monitor and if pain becomes worse or she experiences previously listed symptoms to go to OB ED for evaluation

## 2017-03-15 ENCOUNTER — CLINICAL SUPPORT (OUTPATIENT)
Dept: INFECTIOUS DISEASES | Facility: CLINIC | Age: 22
End: 2017-03-15
Payer: MEDICAID

## 2017-03-15 ENCOUNTER — ROUTINE PRENATAL (OUTPATIENT)
Dept: OBSTETRICS AND GYNECOLOGY | Facility: CLINIC | Age: 22
End: 2017-03-15
Payer: MEDICAID

## 2017-03-15 VITALS
SYSTOLIC BLOOD PRESSURE: 104 MMHG | WEIGHT: 157.44 LBS | DIASTOLIC BLOOD PRESSURE: 62 MMHG | BODY MASS INDEX: 27.88 KG/M2

## 2017-03-15 DIAGNOSIS — Z87.51 HISTORY OF PRETERM DELIVERY: Primary | ICD-10-CM

## 2017-03-15 DIAGNOSIS — Z34.93 NORMAL PREGNANCY IN THIRD TRIMESTER: ICD-10-CM

## 2017-03-15 PROCEDURE — 99213 OFFICE O/P EST LOW 20 MIN: CPT | Mod: TH,S$PBB,, | Performed by: OBSTETRICS & GYNECOLOGY

## 2017-03-15 PROCEDURE — 90715 TDAP VACCINE 7 YRS/> IM: CPT | Mod: PBBFAC

## 2017-03-15 PROCEDURE — 99999 PR PBB SHADOW E&M-EST. PATIENT-LVL II: CPT | Mod: PBBFAC,,, | Performed by: OBSTETRICS & GYNECOLOGY

## 2017-03-15 PROCEDURE — 90471 IMMUNIZATION ADMIN: CPT | Mod: PBBFAC

## 2017-03-15 PROCEDURE — 99999 PR PBB SHADOW E&M-EST. PATIENT-LVL I: CPT | Mod: PBBFAC,,,

## 2017-03-15 PROCEDURE — 99211 OFF/OP EST MAY X REQ PHY/QHP: CPT | Mod: PBBFAC,27,25

## 2017-03-15 NOTE — PROGRESS NOTES
Still having discharge.  No odor.  Was treated two weeks ago for BV.  Reports same discharge as before.  Is on the last day of flagyl. Will wait to see if symptoms improve.    Good fetal movement.  No contractions, no vaginal bleeding, and no loss of fluid.

## 2017-03-15 NOTE — MR AVS SNAPSHOT
[facility] - OB/GYN 5th Floor  [address]  [facility] [address]  Phone: [phone]                  Dannielle De Jesus   3/15/2017 2:00 PM   Routine Prenatal    Description:  Female : 1995   Provider:  Jessica Carrillo MD   Department:  [facility] - OB/GYN 5th Floor           Reason for Visit     Routine Prenatal Visit                To Do List           Future Appointments        Provider Department Dept Phone    3/15/2017 2:40 PM INJECTION, INFECTIOUS DISEASES [facility]- ID Injection Room [phone]    3/29/2017 1:00 PM Jessica Carrillo MD [facility] OB/GYN 5th Floor [phone]      Goals (5 Years of Data)     None      [facility]     [facility] On Call Nurse Care Line -  Assistance  Registered nurses in the [facility] On Call Center provide clinical advisement, health education, appointment booking, and other advisory services.  Call for this free service at [phone].             Medications           Message regarding Medications     Verify the changes and/or additions to your medication regime listed below are the same as discussed with your clinician today.  If any of these changes or additions are incorrect, please notify your healthcare provider.             Verify that the below list of medications is an accurate representation of the medications you are currently taking.  If none reported, the list may be blank. If incorrect, please contact your healthcare provider. Carry this list with you in case of emergency.           Current Medications     prenatal #108-iron,carbonyl-FA 30-1 mg Tab Take by mouth once daily.           Clinical Reference Information           Prenatal Vitals     Enc. Date GA Prenatal Vitals Prenatal Pulse Pain Level Urine Albumin/Glucose Edema Presentation Dilation/Effacement/Station    3/15/17 32w2d 104/62 / 71.4 kg (157 lb 6.5 oz)  /  / Present  0 Negative / Negative       3/2/17 30w3d 100/60 / 69.7 kg (153 lb 10.6 oz) 29 cm / +++ /  "Present  0 Negative / Negative None / None  0 / 40 / -3    17 27w0d 100/60 / 69 kg (152 lb 1.9 oz) 26 cm / +++ / Present  0 Negative / Negative None / None      17 26w0d Admission Dx:  uterine contractions Dept: [facility]    17 23w4d 100/60 / 67 kg (147 lb 11.3 oz) 22 cm / +++ / Present  0 Negative / Negative None / None      16 17w4d 100/60 / 65 kg (143 lb 4.8 oz)  / +++ / Present  0 Negative / Negative None / None      16 16w0d 90/60 / 65 kg (143 lb 4.8 oz)  / +++  0 Negative / Negative None / None      10/25/16 12w1d 100/60 / 63 kg (138 lb 14.2 oz)  / +++  0 Negative / Negative None / None      16 8w1d 110/70 / 66 kg (145 lb 8.1 oz)              TW.629 kg (12 lb 6.5 oz)   Pregravid weight: 65.8 kg (145 lb)   Number of fetuses: 1   Height: 5' 3" (1.6 m)   BMI: 25.7       Your Vitals Were     BP Weight Last Period BMI       104/62 71.4 kg (157 lb 6.5 oz) 2016 27.88 kg/m2       Allergies as of 3/15/2017     No Known Allergies      Immunizations Administered on Date of Encounter - 3/15/2017     None      Language Assistance Services     ATTENTION: Language assistance services are available, free of charge. Please call [phone].      ATENCIÓN: Si orinla daxzhane, tiene a kwong disposición servicios gratuitos de asistencia lingüística. Llame al [phone].     CHÚ Ý: N?u b?n nói Ti?ng Vi?t, có các d?ch v? h? tr? ngôn ng? mi?n phí dành cho b?n. G?i s? [phone].         [facility] - OB/GYN 5th Floor complies with applicable Federal civil rights laws and does not discriminate on the basis of race, color, national origin, age, disability, or sex.        "

## 2017-03-20 ENCOUNTER — PATIENT MESSAGE (OUTPATIENT)
Dept: OBSTETRICS AND GYNECOLOGY | Facility: CLINIC | Age: 22
End: 2017-03-20

## 2017-03-29 ENCOUNTER — ROUTINE PRENATAL (OUTPATIENT)
Dept: OBSTETRICS AND GYNECOLOGY | Facility: CLINIC | Age: 22
End: 2017-03-29
Payer: MEDICAID

## 2017-03-29 VITALS
SYSTOLIC BLOOD PRESSURE: 102 MMHG | WEIGHT: 155.63 LBS | BODY MASS INDEX: 27.57 KG/M2 | DIASTOLIC BLOOD PRESSURE: 72 MMHG

## 2017-03-29 DIAGNOSIS — Z34.93 NORMAL PREGNANCY IN THIRD TRIMESTER: ICD-10-CM

## 2017-03-29 DIAGNOSIS — Z87.51 HISTORY OF PRETERM DELIVERY: Primary | ICD-10-CM

## 2017-03-29 DIAGNOSIS — N89.8 VAGINAL DISCHARGE: ICD-10-CM

## 2017-03-29 PROCEDURE — 99213 OFFICE O/P EST LOW 20 MIN: CPT | Mod: TH,S$PBB,, | Performed by: OBSTETRICS & GYNECOLOGY

## 2017-03-29 PROCEDURE — 99999 PR PBB SHADOW E&M-EST. PATIENT-LVL II: CPT | Mod: PBBFAC,,, | Performed by: OBSTETRICS & GYNECOLOGY

## 2017-03-29 PROCEDURE — 87480 CANDIDA DNA DIR PROBE: CPT

## 2017-03-29 PROCEDURE — 99212 OFFICE O/P EST SF 10 MIN: CPT | Mod: PBBFAC | Performed by: OBSTETRICS & GYNECOLOGY

## 2017-03-31 ENCOUNTER — PATIENT MESSAGE (OUTPATIENT)
Dept: OBSTETRICS AND GYNECOLOGY | Facility: HOSPITAL | Age: 22
End: 2017-03-31

## 2017-03-31 DIAGNOSIS — N76.0 BV (BACTERIAL VAGINOSIS): Primary | ICD-10-CM

## 2017-03-31 DIAGNOSIS — B96.89 BV (BACTERIAL VAGINOSIS): Primary | ICD-10-CM

## 2017-03-31 RX ORDER — METRONIDAZOLE 7.5 MG/G
1 GEL VAGINAL NIGHTLY
Qty: 70 G | Refills: 0 | Status: SHIPPED | OUTPATIENT
Start: 2017-03-31 | End: 2017-04-07

## 2017-04-05 ENCOUNTER — ROUTINE PRENATAL (OUTPATIENT)
Dept: OBSTETRICS AND GYNECOLOGY | Facility: CLINIC | Age: 22
End: 2017-04-05
Payer: MEDICAID

## 2017-04-05 VITALS — BODY MASS INDEX: 27.73 KG/M2 | DIASTOLIC BLOOD PRESSURE: 60 MMHG | SYSTOLIC BLOOD PRESSURE: 114 MMHG | WEIGHT: 156.5 LBS

## 2017-04-05 DIAGNOSIS — D50.8 IRON DEFICIENCY ANEMIA SECONDARY TO INADEQUATE DIETARY IRON INTAKE: Primary | ICD-10-CM

## 2017-04-05 DIAGNOSIS — Z34.93 NORMAL PREGNANCY IN THIRD TRIMESTER: Primary | ICD-10-CM

## 2017-04-05 PROCEDURE — 99999 PR PBB SHADOW E&M-EST. PATIENT-LVL II: CPT | Mod: PBBFAC,,, | Performed by: OBSTETRICS & GYNECOLOGY

## 2017-04-05 PROCEDURE — 99212 OFFICE O/P EST SF 10 MIN: CPT | Mod: PBBFAC | Performed by: OBSTETRICS & GYNECOLOGY

## 2017-04-05 PROCEDURE — 99213 OFFICE O/P EST LOW 20 MIN: CPT | Mod: TH,S$PBB,, | Performed by: OBSTETRICS & GYNECOLOGY

## 2017-04-05 RX ORDER — FERROUS SULFATE 325(65) MG
325 TABLET ORAL 2 TIMES DAILY
Qty: 60 TABLET | Refills: 3 | Status: SHIPPED | OUTPATIENT
Start: 2017-04-05 | End: 2018-03-06

## 2017-04-06 ENCOUNTER — TELEPHONE (OUTPATIENT)
Dept: OBSTETRICS AND GYNECOLOGY | Facility: CLINIC | Age: 22
End: 2017-04-06

## 2017-04-06 NOTE — TELEPHONE ENCOUNTER
----- Message from Lissett Carvalho sent at 4/5/2017  1:28 PM CDT -----  Contact: pt   _X  1st Request  _  2nd Request  _  3rd Request        Who: ALEXIS COFFMAN [[id number]]    Why: pt is calling in regards to getting her [place] injection pt states she is 3cm and would like to knw if she needs to continue with the injections     What Number to Call Back: [phone].    When to Expect a call back: (Before the end of the day)   -- if the call is after 12:00, the call back will be tomorrow.

## 2017-04-11 ENCOUNTER — ROUTINE PRENATAL (OUTPATIENT)
Dept: OBSTETRICS AND GYNECOLOGY | Facility: CLINIC | Age: 22
End: 2017-04-11
Payer: MEDICAID

## 2017-04-11 VITALS
DIASTOLIC BLOOD PRESSURE: 64 MMHG | BODY MASS INDEX: 28.63 KG/M2 | WEIGHT: 161.63 LBS | SYSTOLIC BLOOD PRESSURE: 110 MMHG

## 2017-04-11 DIAGNOSIS — Z87.51 HISTORY OF PRETERM DELIVERY: Primary | ICD-10-CM

## 2017-04-11 DIAGNOSIS — Z34.93 NORMAL PREGNANCY IN THIRD TRIMESTER: ICD-10-CM

## 2017-04-11 PROCEDURE — 99213 OFFICE O/P EST LOW 20 MIN: CPT | Mod: TH,S$PBB,, | Performed by: OBSTETRICS & GYNECOLOGY

## 2017-04-11 PROCEDURE — 99999 PR PBB SHADOW E&M-EST. PATIENT-LVL II: CPT | Mod: PBBFAC,,, | Performed by: OBSTETRICS & GYNECOLOGY

## 2017-04-11 PROCEDURE — 99212 OFFICE O/P EST SF 10 MIN: CPT | Mod: PBBFAC | Performed by: OBSTETRICS & GYNECOLOGY

## 2017-04-11 NOTE — PROGRESS NOTES
Good fetal movement.  No contractions, no vaginal bleeding, and no loss of fluid.  Labor precautions.  Still complaining of vaginal discharge/irritation.  Complete vaginal suppositories.  Consider retest next week.

## 2017-04-11 NOTE — MR AVS SNAPSHOT
[facility] - OB/GYN [address]  [address]  [address]  [facility] [place] [address]  Phone: [phone]  Fax: [phone]                  Dannielle De Jesus   2017 3:00 PM   Routine Prenatal    Description:  Female : 1995   Provider:  Jessica Carrillo MD   Department:  [facility] - OB/GYN [address]           Reason for Visit     Routine Prenatal Visit                To Do List           Future Appointments        Provider Department Dept Phone    2017 2:00 PM Jessica Carrillo MD [facility] - OB/GYN [address] [phone]    2017 8:30 AM Jessica Carrillo MD [facility] - OB/GYN [address] [phone]      Goals (5 Years of Data)     None      [facility]     [facility] On Call Nurse Care Line -  Assistance  Unless otherwise directed by your provider, please contact [facility], our nurse care line that is available for  assistance.     Registered nurses in the [facility] Center provide: appointment scheduling, clinical advisement, health education, and other advisory services.  Call: [phone] (toll free)               Medications           Message regarding Medications     Verify the changes and/or additions to your medication regime listed below are the same as discussed with your clinician today.  If any of these changes or additions are incorrect, please notify your healthcare provider.             Verify that the below list of medications is an accurate representation of the medications you are currently taking.  If none reported, the list may be blank. If incorrect, please contact your healthcare provider. Carry this list with you in case of emergency.           Current Medications     ferrous sulfate 325 mg (65 mg iron) Tab tablet Take 1 tablet (325 mg total) by mouth 2 (two) times daily.    prenatal #108-iron,carbonyl-FA 30-1 mg Tab Take by mouth once daily.           Clinical Reference Information           Prenatal Vitals     Enc. Date GA Prenatal  "Vitals Prenatal Pulse Pain Level Urine Albumin/Glucose Edema Presentation Dilation/Effacement/Station    17 36w1d 110/64 / 73.3 kg (161 lb 9.6 oz)   5        17 35w2d 114/60 / 71 kg (156 lb 8.4 oz) 35 cm / +++ / Present  5 Negative / Negative None / None Vertex 3  40 / -1    3/29/17 34w2d 102/72 / 70.6 kg (155 lb 10.3 oz) 33 cm / +++ / Present  0 Trace / Negative None / None      3/15/17 32w2d 104/62 / 71.4 kg (157 lb 6.5 oz) 32 cm / +++ / Present  0 Negative / Negative None / None      3/2/17 30w3d 100/60 / 69.7 kg (153 lb 10.6 oz) 29 cm / +++ / Present  0 Negative / Negative None / None   / -3    17 27w0d 100/60 / 69 kg (152 lb 1.9 oz) 26 cm / +++ / Present  0 Negative / Negative None / None      17 26w0d Admission Dx:  uterine contractions Dept: [facility]    17 23w4d 100/60 / 67 kg (147 lb 11.3 oz) 22 cm / +++ / Present  0 Negative / Negative None / None      16 17w4d 100/60 / 65 kg (143 lb 4.8 oz)  / +++ / Present  0 Negative / Negative None / None      16 16w0d 90/60 / 65 kg (143 lb 4.8 oz)  / +++  0 Negative / Negative None / None      10/25/16 12w1d 100/60 / 63 kg (138 lb 14.2 oz)  / +++  0 Negative / Negative None / None      16 8w1d 110/70 / 66 kg (145 lb 8.1 oz)              TW.528 kg (16 lb 9.6 oz)   Pregravid weight: 65.8 kg (145 lb)   Number of fetuses: 1   Height: 5' 3" (1.6 m)   BMI: 25.7       Your Vitals Were     BP Weight Last Period BMI       110/64 73.3 kg (161 lb 9.6 oz) 2016 28.63 kg/m2       Allergies as of 2017     No Known Allergies      Immunizations Administered on Date of Encounter - 2017     None      Language Assistance Services     ATTENTION: Language assistance services are available, free of charge. Please call [phone].      ATENCIÓN: Si habla anival, tiene a kwong disposición servicios gratuitos de asistencia lingüística. Llame al [phone].     CHÚ Ý: N?u b?n nói Ti?ng Vi?t, có các d?ch v? h? tr? " rula wagner? mi?n phí dành cho b?n. G?i s? [phone].         [facility] - OB/GYN [address] complies with applicable Federal civil rights laws and does not discriminate on the basis of race, color, national origin, age, disability, or sex.

## 2017-04-20 ENCOUNTER — ROUTINE PRENATAL (OUTPATIENT)
Dept: OBSTETRICS AND GYNECOLOGY | Facility: CLINIC | Age: 22
End: 2017-04-20
Payer: MEDICAID

## 2017-04-20 VITALS
BODY MASS INDEX: 28.51 KG/M2 | DIASTOLIC BLOOD PRESSURE: 70 MMHG | SYSTOLIC BLOOD PRESSURE: 112 MMHG | WEIGHT: 160.94 LBS

## 2017-04-20 DIAGNOSIS — Z34.93 NORMAL PREGNANCY IN THIRD TRIMESTER: Primary | ICD-10-CM

## 2017-04-20 PROCEDURE — 99999 PR PBB SHADOW E&M-EST. PATIENT-LVL II: CPT | Mod: PBBFAC,,, | Performed by: OBSTETRICS & GYNECOLOGY

## 2017-04-20 PROCEDURE — 99213 OFFICE O/P EST LOW 20 MIN: CPT | Mod: TH,S$PBB,, | Performed by: OBSTETRICS & GYNECOLOGY

## 2017-04-20 NOTE — MR AVS SNAPSHOT
[facility] - OB/GYN [address]  [address]  [address]  [facility] [address]  Phone: [phone]  Fax: [phone]                  Dannielle De Jesus   2017 2:00 PM   Routine Prenatal    Description:  Female : 1995   Provider:  Jessica Carrillo MD   Department:  [facility] - OB/GYN [address]           Reason for Visit     Routine Prenatal Visit                To Do List           Future Appointments        Provider Department Dept Phone    2017 8:30 AM Jessica Carrillo MD [facility] - OB/GYN [address] [phone]      Goals (5 Years of Data)     None      [facility]     [facility] On Call Nurse Care Line -  Assistance  Unless otherwise directed by your provider, please contact [facility], our nurse care line that is available for  assistance.     Registered nurses in the [facility] On Call Center provide: appointment scheduling, clinical advisement, health education, and other advisory services.  Call: [phone] (toll free)               Medications           Message regarding Medications     Verify the changes and/or additions to your medication regime listed below are the same as discussed with your clinician today.  If any of these changes or additions are incorrect, please notify your healthcare provider.             Verify that the below list of medications is an accurate representation of the medications you are currently taking.  If none reported, the list may be blank. If incorrect, please contact your healthcare provider. Carry this list with you in case of emergency.           Current Medications     prenatal #108-iron,carbonyl-FA 30-1 mg Tab Take by mouth once daily.    ferrous sulfate 325 mg (65 mg iron) Tab tablet Take 1 tablet (325 mg total) by mouth 2 (two) times daily.           Clinical Reference Information           Prenatal Vitals     Enc. Date GA Prenatal Vitals Prenatal Pulse Pain Level Urine Albumin/Glucose Edema Presentation  "Dilation/Effacement/Station    17 37w3d 112/70 / 73 kg (160 lb 15 oz)           17 36w1d 110/64 / 73.3 kg (161 lb 9.6 oz) 36 cm / +++ / Present  5 Trace / Negative None / None Vertex 3 / 70 / -1    17 35w2d 114/60 / 71 kg (156 lb 8.4 oz) 35 cm / +++ / Present  5 Negative / Negative None / None Vertex 3 / 40 / -1    3/29/17 34w2d 102/72 / 70.6 kg (155 lb 10.3 oz) 33 cm / +++ / Present  0 Trace / Negative None / None      3/15/17 32w2d 104/62 / 71.4 kg (157 lb 6.5 oz) 32 cm / +++ / Present  0 Negative / Negative None / None      3/2/17 30w3d 100/60 / 69.7 kg (153 lb 10.6 oz) 29 cm / +++ / Present  0 Negative / Negative None / None   / -3    17 27w0d 100/60 / 69 kg (152 lb 1.9 oz) 26 cm / +++ / Present  0 Negative / Negative None / None      17 26w0d Admission Dx:  uterine contractions Dept: [facility]    17 23w4d 100/60 / 67 kg (147 lb 11.3 oz) 22 cm / +++ / Present  0 Negative / Negative None / None      16 17w4d 100/60 / 65 kg (143 lb 4.8 oz)  / +++ / Present  0 Negative / Negative None / None      16 16w0d 90/60 / 65 kg (143 lb 4.8 oz)  / +++  0 Negative / Negative None / None      10/25/16 12w1d 100/60 / 63 kg (138 lb 14.2 oz)  / +++  0 Negative / Negative None / None      16 8w1d 110/70 / 66 kg (145 lb 8.1 oz)              TW.228 kg (15 lb 15 oz)   Pregravid weight: 65.8 kg (145 lb)   Number of fetuses: 1   Height: 5' 3" (1.6 m)   BMI: 25.7       Your Vitals Were     BP Weight Last Period BMI       112/70 73 kg (160 lb 15 oz) 2016 28.51 kg/m2       Allergies as of 2017     No Known Allergies      Immunizations Administered on Date of Encounter - 2017     None      Language Assistance Services     ATTENTION: Language assistance services are available, free of charge. Please call [phone].      ATENCIÓN: Si habla español, tiene a kwong disposición servicios gratuitos de asistencia lingüística. Llame al [phone].     CHÚ Ý: N?u " b?n nói Ti?ng Vi?t, có các d?ch v? h? tr? ngôn ng? mi?n phí dành cho b?n. G?i s? [phone].         [facility] - OB/[facility]N [address] complies with applicable Federal civil rights laws and does not discriminate on the basis of race, color, national origin, age, disability, or sex.

## 2017-04-20 NOTE — PROGRESS NOTES
Good fetal movement.  No contractions, no vaginal bleeding, and no loss of fluid.  Labor precautions.

## 2017-04-21 ENCOUNTER — HOSPITAL ENCOUNTER (INPATIENT)
Facility: OTHER | Age: 22
LOS: 2 days | Discharge: HOME | End: 2017-04-23
Attending: OBSTETRICS & GYNECOLOGY | Admitting: OBSTETRICS & GYNECOLOGY
Payer: MEDICAID

## 2017-04-21 DIAGNOSIS — Z3A.37 37 WEEKS GESTATION OF PREGNANCY: ICD-10-CM

## 2017-04-21 DIAGNOSIS — Z22.330 CARRIER OF GROUP B STREPTOCOCCUS: ICD-10-CM

## 2017-04-21 LAB
ABO + RH BLD: NORMAL
ALLENS TEST: ABNORMAL
BASOPHILS # BLD AUTO: 0.01 K/UL
BASOPHILS NFR BLD: 0.1 %
BLD GP AB SCN CELLS X3 SERPL QL: NORMAL
DIFFERENTIAL METHOD: ABNORMAL
EOSINOPHIL # BLD AUTO: 0.1 K/UL
EOSINOPHIL NFR BLD: 0.8 %
ERYTHROCYTE [DISTWIDTH] IN BLOOD BY AUTOMATED COUNT: 13.7 %
HCO3 UR-SCNC: 19.4 MMOL/L (ref 24–28)
HCT VFR BLD AUTO: 29 %
HGB BLD-MCNC: 9.8 G/DL
LYMPHOCYTES # BLD AUTO: 1.6 K/UL
LYMPHOCYTES NFR BLD: 15.3 %
MCH RBC QN AUTO: 27.8 PG
MCHC RBC AUTO-ENTMCNC: 33.8 %
MCV RBC AUTO: 82 FL
MONOCYTES # BLD AUTO: 0.9 K/UL
MONOCYTES NFR BLD: 8.5 %
NEUTROPHILS # BLD AUTO: 7.8 K/UL
NEUTROPHILS NFR BLD: 75 %
PCO2 BLDA: 37.5 MMHG (ref 35–45)
PH SMN: 7.32 [PH] (ref 7.35–7.45)
PLATELET # BLD AUTO: 251 K/UL
PMV BLD AUTO: 9.4 FL
PO2 BLDA: 18 MMHG (ref 80–100)
POC BE: -7 MMOL/L
POC SATURATED O2: 24 % (ref 95–100)
RBC # BLD AUTO: 3.53 M/UL
SAMPLE: ABNORMAL
SITE: ABNORMAL
WBC # BLD AUTO: 10.34 K/UL

## 2017-04-21 PROCEDURE — 86850 RBC ANTIBODY SCREEN: CPT

## 2017-04-21 PROCEDURE — 59409 OBSTETRICAL CARE: CPT | Mod: AT,,, | Performed by: OBSTETRICS & GYNECOLOGY

## 2017-04-21 PROCEDURE — 86900 BLOOD TYPING SEROLOGIC ABO: CPT

## 2017-04-21 PROCEDURE — 99285 EMERGENCY DEPT VISIT HI MDM: CPT | Mod: 25

## 2017-04-21 PROCEDURE — 25000003 PHARM REV CODE 250: Performed by: STUDENT IN AN ORGANIZED HEALTH CARE EDUCATION/TRAINING PROGRAM

## 2017-04-21 PROCEDURE — 85025 COMPLETE CBC W/AUTO DIFF WBC: CPT

## 2017-04-21 PROCEDURE — 99900035 HC TECH TIME PER 15 MIN (STAT)

## 2017-04-21 PROCEDURE — 59025 FETAL NON-STRESS TEST: CPT | Mod: 26,,, | Performed by: OBSTETRICS & GYNECOLOGY

## 2017-04-21 PROCEDURE — 59025 FETAL NON-STRESS TEST: CPT

## 2017-04-21 PROCEDURE — 82803 BLOOD GASES ANY COMBINATION: CPT

## 2017-04-21 PROCEDURE — 11000001 HC ACUTE MED/SURG PRIVATE ROOM

## 2017-04-21 PROCEDURE — 99283 EMERGENCY DEPT VISIT LOW MDM: CPT | Mod: 25,TH,S$PBB, | Performed by: OBSTETRICS & GYNECOLOGY

## 2017-04-21 RX ORDER — SODIUM CHLORIDE, SODIUM LACTATE, POTASSIUM CHLORIDE, CALCIUM CHLORIDE 600; 310; 30; 20 MG/100ML; MG/100ML; MG/100ML; MG/100ML
INJECTION, SOLUTION INTRAVENOUS CONTINUOUS
Status: DISCONTINUED | OUTPATIENT
Start: 2017-04-21 | End: 2017-04-22

## 2017-04-21 RX ORDER — BUPIVACAINE HYDROCHLORIDE 2.5 MG/ML
INJECTION, SOLUTION EPIDURAL; INFILTRATION; INTRACAUDAL
Status: DISCONTINUED
Start: 2017-04-21 | End: 2017-04-21 | Stop reason: WASHOUT

## 2017-04-21 RX ORDER — CARBOPROST TROMETHAMINE 250 UG/ML
INJECTION, SOLUTION INTRAMUSCULAR
Status: DISCONTINUED
Start: 2017-04-21 | End: 2017-04-21 | Stop reason: WASHOUT

## 2017-04-21 RX ORDER — FENTANYL/BUPIVACAINE/NS/PF 2MCG/ML-.1
PLASTIC BAG, INJECTION (ML) INJECTION
Status: DISCONTINUED
Start: 2017-04-21 | End: 2017-04-21 | Stop reason: WASHOUT

## 2017-04-21 RX ORDER — KETOROLAC TROMETHAMINE 30 MG/ML
30 INJECTION, SOLUTION INTRAMUSCULAR; INTRAVENOUS EVERY 6 HOURS
Status: CANCELLED | OUTPATIENT
Start: 2017-04-21 | End: 2017-04-22

## 2017-04-21 RX ORDER — IBUPROFEN 400 MG/1
800 TABLET ORAL EVERY 8 HOURS
Status: CANCELLED | OUTPATIENT
Start: 2017-04-22

## 2017-04-21 RX ORDER — FENTANYL CITRATE 50 UG/ML
INJECTION, SOLUTION INTRAMUSCULAR; INTRAVENOUS
Status: DISCONTINUED
Start: 2017-04-21 | End: 2017-04-21 | Stop reason: WASHOUT

## 2017-04-21 RX ORDER — MISOPROSTOL 200 UG/1
TABLET ORAL
Status: DISCONTINUED
Start: 2017-04-21 | End: 2017-04-21 | Stop reason: WASHOUT

## 2017-04-21 RX ORDER — LIDOCAINE HYDROCHLORIDE 10 MG/ML
INJECTION INFILTRATION; PERINEURAL
Status: DISCONTINUED
Start: 2017-04-21 | End: 2017-04-21 | Stop reason: WASHOUT

## 2017-04-21 RX ORDER — METHYLERGONOVINE MALEATE 0.2 MG/ML
INJECTION INTRAVENOUS
Status: DISCONTINUED
Start: 2017-04-21 | End: 2017-04-21 | Stop reason: WASHOUT

## 2017-04-21 RX ORDER — ONDANSETRON 8 MG/1
8 TABLET, ORALLY DISINTEGRATING ORAL EVERY 8 HOURS PRN
Status: DISCONTINUED | OUTPATIENT
Start: 2017-04-21 | End: 2017-04-22

## 2017-04-21 RX ORDER — OXYTOCIN/RINGER'S LACTATE 20/1000 ML
2 PLASTIC BAG, INJECTION (ML) INTRAVENOUS CONTINUOUS
Status: DISCONTINUED | OUTPATIENT
Start: 2017-04-21 | End: 2017-04-22

## 2017-04-21 RX ADMIN — SODIUM CHLORIDE, SODIUM LACTATE, POTASSIUM CHLORIDE, AND CALCIUM CHLORIDE: .6; .31; .03; .02 INJECTION, SOLUTION INTRAVENOUS at 03:04

## 2017-04-21 RX ADMIN — Medication 41.65 MILLI-UNITS/MIN: at 11:04

## 2017-04-21 RX ADMIN — Medication 2 MILLI-UNITS/MIN: at 04:04

## 2017-04-21 RX ADMIN — Medication 2.5 MILLION UNITS: at 05:04

## 2017-04-21 RX ADMIN — Medication 2.5 MILLION UNITS: at 04:04

## 2017-04-21 RX ADMIN — Medication 2.5 MILLION UNITS: at 08:04

## 2017-04-21 NOTE — ED PROVIDER NOTES
Encounter Date: 2017       History     Chief Complaint   Patient presents with    Rupture of Membranes     1200     Review of patient's allergies indicates:  No Known Allergies  HPI Comments: Dannielle De Jesus is a 21 y.o. [id number] at 37w4d presents complaining of leakage of fluid. She reports feeling a gush at noon today, with continued leakage since that time. She has not felt any contractions since this morning. The fluid is clear and non-bloody.     This IUP is complicated by GBS UTI and a previous 36 week delivery.  Patient denies contractions, denies vaginal bleeding, reports LOF.   Fetal Movement: normal.    No past medical history on file.  No past surgical history on file.  Family History   Problem Relation Age of Onset    Cancer Neg Hx      labor Neg Hx     Diabetes Neg Hx      Social History   Substance Use Topics    Smoking status: Never Smoker    Smokeless tobacco: Not on file    Alcohol use No     Review of Systems   Constitutional: Negative for chills and fever.   HENT: Negative for sore throat.    Respiratory: Negative for shortness of breath.    Cardiovascular: Negative for chest pain.   Gastrointestinal: Negative for nausea.   Genitourinary: Positive for vaginal discharge. Negative for dysuria.   Musculoskeletal: Negative for back pain.   Skin: Negative for rash.   Neurological: Negative for weakness.   Hematological: Does not bruise/bleed easily.       Physical Exam        Physical Exam    Nursing note and vitals reviewed.  Constitutional: She appears well-developed and well-nourished. She is not diaphoretic. No distress.   HENT:   Head: Normocephalic and atraumatic.   Right Ear: External ear normal.   Left Ear: External ear normal.   Eyes: Conjunctivae and EOM are normal.   Neck: Normal range of motion.   Cardiovascular: Normal rate.   Pulmonary/Chest: No respiratory distress.   Musculoskeletal: Normal range of motion.   Neurological: She is alert and oriented to person,  place, and time.   Skin: Skin is warm and dry.   Psychiatric: She has a normal mood and affect.     OB LABOR EXAM:       Method: Sterile speculum exam per MD.       Dilatation: 4.   Station: -2.   Amniotic Fluid Color: normal and clear.   Amniotic Fluid Amount: moderate.   Comments: NST Interpretation:   150 BPM baseline  Variability: moderate  Accelerations: present  Decelerations: absent    Clinical Impression: Reactive Non-Stress Test         ED Course   Procedures   Fetal NST  FHR: 150 BPM baseline  Variability; moderate  Accelerations: Present;   Decelerations absent  Tocodynometry: irregular contractions  Assessment: reactive Fetal Nonstress test  Labs Reviewed   POCT RUPTURE OF MEMBRANE             Medical Decision Making:   Initial Assessment:   21 y.o. [id number] at 37w4d presents complaining of leakage of fluid.  Differential Diagnosis:   PROM  ED Management:  Patient complains of leakage of fluid. VSS and WNL. NST reactive and reassuring. No contractions on TOCO. Speculum exam with positive Valsalva, pooling, ferning, and nitrazine. SVE 4/70/-2. Vertex by sutures.    Will admit to L&D for PROM.    Gary Doyle MD  PGY-1 OB/GYN  [phone]    Discussed plan with Dr. Gao who was in agreement.                Attending Attestation:   Physician Attestation Statement for Resident:  As the supervising MD   Physician Attestation Statement: I have personally seen and examined this patient.   I agree with the above history. -:   As the supervising MD I agree with the above PE.    As the supervising MD I agree with the above treatment, course, plan, and disposition.  I was personally present during the critical portions of the procedure(s) performed by the resident and was immediately available in the ED to provide services and assistance as needed during the entire procedure.  I have reviewed the following: old records at this facility.                    ED Course     Clinical Impression:   The primary encounter  diagnosis was Rupture of membranes with clear amniotic fluid. Diagnoses of Carrier of group B Streptococcus and 37 weeks gestation of pregnancy were also pertinent to this visit.    Disposition:   Disposition: Admitted  Condition: Stable       Anup Doyle MD  Resident  04/21/17 1516       Ashley Gao MD  04/21/17 6549

## 2017-04-21 NOTE — NURSING
Pt arrived to LDR 9 SROM @1200, clear. Nil bleeding noted. Pt GBS+ for PCN every 4h during labor. Initial dose of 5mill units PCN in 100ml given in two doses of 2.5mill units in 50ml. Pharmacy consulted and verified means of administration. MAR corrected to reflect this.

## 2017-04-21 NOTE — IP AVS SNAPSHOT
[facility] Location ([facility])  [address] [place], [place] [address]  Phone: [phone]           Patient Discharge Instructions   Our goal is to set you up for success. This packet includes information on your condition, medications, and your home care.  It will help you care for yourself to prevent having to return to the hospital.     Please ask your nurse if you have any questions.      There are many details to remember when preparing to leave the hospital. Here is what you will need to do:    1. Take your medicine. If you are prescribed medications, review your Medication List on the following pages. You may have new medications to  at the pharmacy and others that you'll need to stop taking. Review the instructions for how and when to take your medications. Talk with your doctor or nurses if you are unsure of what to do.     2. Go to your follow-up appointments. Specific follow-up information is listed in the following pages. Your may be contacted by a nurse or clinical provider about future appointments. Be sure we have all of the phone numbers to reach you. Please contact your provider's office if you are unable to make an appointment.     3. Watch for warning signs. Your doctor or nurse will give you detailed warning signs to watch for and when to call for assistance. These instructions may also include educational information about your condition. If you experience any of warning signs to your health, call your doctor.           [facility]  Unless otherwise directed by your provider, please   contact [facility], our nurse care line   that is available for 24/7 assistance.     [phone] (toll-free)     Registered nurses in the [facility]   provide: appointment scheduling, clinical advisement, health education, and other advisory services.                  ** Verify the list of medication(s) below is accurate and up to date. Carry this with you in case of  emergency. If your medications have changed, please notify your healthcare provider.             Medication List      START taking these medications        Additional Info                      ibuprofen 600 MG tablet   Commonly known as:  ADVIL,MOTRIN   Quantity:  30 tablet   Refills:  2   Dose:  600 mg    Instructions:  Take 1 tablet (600 mg total) by mouth 3 (three) times daily.     Begin Date    AM    Noon    PM    Bedtime         CONTINUE taking these medications        Additional Info                      ferrous sulfate 325 mg (65 mg iron) Tab tablet   Quantity:  60 tablet   Refills:  3   Dose:  325 mg    Instructions:  Take 1 tablet (325 mg total) by mouth 2 (two) times daily.     Begin Date    AM    Noon    PM    Bedtime       prenatal #108-iron,carbonyl-FA 30-1 mg Tab   Refills:  0    Instructions:  Take by mouth once daily.     Begin Date    AM    Noon    PM    Bedtime            Where to Get Your Medications      These medications were sent to [facility] - [place] - [address]  [address] [place] [address]     Phone:  [phone]     ibuprofen 600 MG tablet                  Please bring to all follow up appointments:    1. A copy of your discharge instructions.  2. All medicines you are currently taking in their original bottles.  3. Identification and insurance card.    Please arrive 15 minutes ahead of scheduled appointment time.    Please call 24 hours in advance if you must reschedule your appointment and/or time.        Your Scheduled Appointments     Apr 27, 2017  8:30 AM CDT   Routine Prenatal Visit with Jessica Carrillo MD   [facility] [address] ([facility])    [address] [place] [id number] [phone]              Follow-up Information     Follow up with Jessica Carrillo MD In 6 weeks.    Specialty:  Obstetrics and Gynecology    Why:  Post-partum check-up    Contact information:    [address] [place] [address]  [facility] [address]  [phone]          Discharge Instructions     Future Orders    Activity as tolerated     Call MD for:  difficulty breathing or increased cough     Call MD for:  increased confusion or weakness     Call MD for:  persistent dizziness, light-headedness, or visual disturbances     Call MD for:  persistent nausea and vomiting or diarrhea     Call MD for:  redness, tenderness, or signs of infection (pain, swelling, redness, odor or green/yellow discharge around incision site)     Call MD for:  severe persistent headache     Call MD for:  severe uncontrolled pain     Call MD for:  temperature >100.4     Diet general     Questions:    Total calories:      Fat restriction, if any:      Protein restriction, if any:      Na restriction, if any:      Fluid restriction:      Additional restrictions:          Discharge Instructions       Breastfeeding Discharge Instructions       Feed the baby at the earliest sign of hunger or comfort  o Hands to mouth, sucking motions  o Rooting or searching for something to suck on  o Dont wait for crying - it is a sign of distress     The feedings may be 8-12 times per 24hrs and will not follow a schedule   Avoid pacifiers and bottles for the first 4 weeks   Alternate the breast you start the feeding with, or start with the breast that feels the fullest   Switch breasts when the baby takes himself off the breast or falls asleep   Keep offering breasts until the baby looks full, no longer gives hunger signs, and stays asleep when placed on his back in the crib   If the baby is sleepy and wont wake for a feeding, put the baby skin-to-skin dressed in a diaper against the mothers bare chest   Sleep near your baby   The baby should be positioned and latched on to the breast correctly  o Chest-to-chest, chin in the breast  o Babys lips are flipped outward  o Babys mouth is stretched open wide like a shout  o Babys sucking should feel like tugging to  the mother  - The baby should be drinking at the breast:  o You should hear swallowing or gulping throughout the feeding  o You should see milk on the babys lips when he comes off the breast  o Your breasts should be softer when the baby is finished feeding  o The baby should look relaxed at the end of feedings  o After the 4th day and your milk is in:  o The babys poop should turn bright yellow and be loose, watery, and seedy  o The baby should have at least 3-4 poops the size of the palm of your hand per day  o The baby should have at least 5-6 wet diapers per day  o The urine should be light yellow in color  You should drink when you are thirsty and eat a healthy diet when you are    hungry.     Take naps to get the rest you need.   Take medications and/or drink alcohol only with permission of your obstetrician    or the babys pediatrician.  You can also call the Infant Risk Center,   ([phone]), Monday-Friday, 8am-5pm Central time, to get the most   up-to-date evidence-based information on the use of medications during   pregnancy and breastfeeding.      The baby should be examined by a pediatrician at 3-5 days of age.  Once your   milk comes in, the baby should be gaining at least ½ - 1oz each day and should be back to birthweight no later than 10-14 days of age.          Community Resources    [facility] Breastfeeding Warmline: [phone]   Local [facility] clinics: provide incentives and breastpumps to eligible mothers  La Leche Lekathryn International (LLLI):  mother-to-mother support group website        www.lll.org  Local La Leche League mother-to-mother support groups:        www.lllNetbyte Hostingson.Pirq        La Leche League of [place]   Dr. Chapito Canales website for latch videos and general information:        www.breastfeedinginc.ca  Infant Risk Center is a call center that provides information about the safety of taking medications while breastfeeding.  Call [phone], M-F, 8am-5pm,  "CT.  International Lactation Consultant Association provides resources for assistance:        www.ilca.org  [facility] Breastfeeding Coalition provides informationand resources for parents  and the community    http://[facility]feeding.org     Jennifer Telles is a mom-to-mom support group:                             www.nolanEfficient Drivetrains.com//breastfeedng-support/  Partners for Healthy Babies:  0-825-423-BABY(4197)  [employer]ait: a breastfeeding support group for women of color, [phone]          Primary Diagnosis     Your primary diagnosis was:  Normal Vaginal Delivery      Admission Information     Date & Time Provider Department CSN    4/21/2017  2:50 PM Jessica Carrillo MD [facility] [id number]      Care Providers     Provider Role Specialty Primary office phone    Jessica Carrillo MD Attending Provider Obstetrics and Gynecology [phone]    Stephani Dawson MD Consulting Physician  Obstetrics and Gynecology [phone]      Your Vitals Were     BP Pulse Temp Resp Height Weight    111/69 (BP Location: Left arm, Patient Position: Lying, BP Method: Automatic) 67 97.8 °F (36.6 °C) (Oral) 18 5' 2" (1.575 m) 73 kg (160 lb 15 oz)    Last Period SpO2 BMI          08/01/2016 98% 29.44 kg/m2        Recent Lab Values     No lab values to display.      Allergies as of 4/23/2017     No Known Allergies      Advance Directives     An advance directive is a document which, in the event you are no longer able to make decisions for yourself, tells your healthcare team what kind of treatment you do or do not want to receive, or who you would like to make those decisions for you.  If you do not currently have an advance directive, [facility] encourages you to create one.  For more information call:  [phone]), [phone] ([phone]),  or log on to [email or web address].        Language Assistance Services     ATTENTION: Language assistance services are available, free of " charge. Please call [phone].      ATENCIÓN: Si habla español, tiene a kwong disposición servicios gratuitos de asistencia lingüística. Llame al [phone].     CHÚ Ý: N?u b?n nói Ti?ng Vi?t, có các d?ch v? h? tr? ngôn ng? mi?n phí dành cho b?n. G?i s? [phone].         [facility] complies with applicable Federal civil rights laws and does not discriminate on the basis of race, color, national origin, age, disability, or sex.

## 2017-04-21 NOTE — H&P
"   History and Physical                                            Obstetrics          Subjective:      Dannielle De Jesus is a 21 y.o. [id number] at 37w4d presents complaining of leakage of fluid. She reports feeling a gush at noon today, with continued leakage since that time. She has not felt any contractions since this morning. The fluid is clear and non-bloody.    Speculum exam in the ED positive for rupture of membranes.       This IUP is complicated by GBS UTI and a previous 36 week delivery. Patient denies contractions, denies vaginal bleeding, reports LOF. Fetal Movement: normal.  PMHx: No past medical history on file.    PSHx: No past surgical history on file.    All: Review of patient's allergies indicates:  No Known Allergies    Meds:   (Not in a hospital admission)    SH:   Social History     Social History    Marital status: Single     Spouse name: N/A    Number of children: N/A    Years of education: N/A     Occupational History    Not on file.     Social History Main Topics    Smoking status: Never Smoker    Smokeless tobacco: Not on file    Alcohol use No    Drug use: No    Sexual activity: Yes     Partners: Male     Birth control/ protection: Injection     Other Topics Concern    Not on file     Social History Narrative       FH:   Family History   Problem Relation Age of Onset    Cancer Neg Hx      labor Neg Hx     Diabetes Neg Hx        OBHx:   Obstetric History       T0      TAB0   SAB0   E0   M0   L1       # Outcome Date GA Lbr Evgeny/2nd Weight Sex Delivery Anes PTL Lv   2 Current            1  13 36w0d  2.523 kg (5 lb 9 oz) M Vag-Spont   Y          Objective:       Ht 5' 2" (1.575 m)  Wt 73 kg (160 lb 15 oz)  LMP 2016  BMI 29.44 kg/m2         General:   alert, appears stated age and cooperative   Lungs:   clear to auscultation bilaterally   Heart:   regular rate and rhythm, S1, S2 normal, no murmur, click, rub or gallop   Abdomen:  soft, " non-tender; bowel sounds normal; no masses,  no organomegaly   Extremities negative edema, negative erythema     Cervical exam: 4/70/-2    SSE: (+) pooling, (+) ferning, (+) Valsalva, (+) nitrazine    Fetal Heart Tones:  NST: reassuring, Category 1, 145 bpm, moderate BTBV, (+) accelerations, (--) decelerations  TOCO: no contractions      Ultrasound:  cephalic    Lab Review  Blood Type O POS  GBBS: positive  Rubella: Immune  RPR: NR  HIV: negative  HepB: negative    Other Labs: No results found for this or any previous visit (from the past 24 hour(s)).         Assessment:     21 y.o.  female with IUP at 37w4d weeks gestation with history of GBS UTI admitted for Rupture of membranes with clear amniotic fluid         Plan:          Rupture of membranes with clear amniotic fluid  - Admit to Labor and Delivery unit  - Consents for vaginal delivery,  section, and blood transfusion signed and to chart  - Risks, benefits, alternatives and possible complications have been discussed in detail with the patient.   - Epidural per Anesthesia  - Draw CBC, T&S  - Notify Staff  - Recheck in 2 hrs or PRN          Post-Partum Hemorrhage risk - low    Anup Doyle MD

## 2017-04-22 LAB
BASOPHILS # BLD AUTO: 0.01 K/UL
BASOPHILS NFR BLD: 0.1 %
DIFFERENTIAL METHOD: ABNORMAL
EOSINOPHIL # BLD AUTO: 0.1 K/UL
EOSINOPHIL NFR BLD: 0.4 %
ERYTHROCYTE [DISTWIDTH] IN BLOOD BY AUTOMATED COUNT: 13.6 %
HCT VFR BLD AUTO: 25.3 %
HGB BLD-MCNC: 8.6 G/DL
LYMPHOCYTES # BLD AUTO: 1.7 K/UL
LYMPHOCYTES NFR BLD: 13.7 %
MCH RBC QN AUTO: 27.7 PG
MCHC RBC AUTO-ENTMCNC: 34 %
MCV RBC AUTO: 81 FL
MONOCYTES # BLD AUTO: 1.1 K/UL
MONOCYTES NFR BLD: 8.2 %
NEUTROPHILS # BLD AUTO: 9.8 K/UL
NEUTROPHILS NFR BLD: 77.2 %
PLATELET # BLD AUTO: 206 K/UL
PMV BLD AUTO: 9.2 FL
RBC # BLD AUTO: 3.11 M/UL
WBC # BLD AUTO: 12.73 K/UL

## 2017-04-22 PROCEDURE — 25000003 PHARM REV CODE 250: Performed by: OBSTETRICS & GYNECOLOGY

## 2017-04-22 PROCEDURE — 85025 COMPLETE CBC W/AUTO DIFF WBC: CPT

## 2017-04-22 PROCEDURE — 11000001 HC ACUTE MED/SURG PRIVATE ROOM

## 2017-04-22 PROCEDURE — 36415 COLL VENOUS BLD VENIPUNCTURE: CPT

## 2017-04-22 PROCEDURE — 72200005 HC VAGINAL DELIVERY LEVEL II

## 2017-04-22 PROCEDURE — 27000181 HC CABLE, IUPC

## 2017-04-22 PROCEDURE — 72100003 HC LABOR CARE, EA. ADDL. 8 HRS

## 2017-04-22 RX ORDER — OXYTOCIN/RINGER'S LACTATE 20/1000 ML
41.65 PLASTIC BAG, INJECTION (ML) INTRAVENOUS CONTINUOUS
Status: ACTIVE | OUTPATIENT
Start: 2017-04-22 | End: 2017-04-22

## 2017-04-22 RX ORDER — MISOPROSTOL 200 UG/1
600 TABLET ORAL
Status: DISCONTINUED | OUTPATIENT
Start: 2017-04-22 | End: 2017-04-23 | Stop reason: HOSPADM

## 2017-04-22 RX ORDER — SIMETHICONE 80 MG
1 TABLET,CHEWABLE ORAL EVERY 6 HOURS PRN
Status: DISCONTINUED | OUTPATIENT
Start: 2017-04-22 | End: 2017-04-23 | Stop reason: HOSPADM

## 2017-04-22 RX ORDER — OXYCODONE AND ACETAMINOPHEN 5; 325 MG/1; MG/1
1 TABLET ORAL EVERY 4 HOURS PRN
Status: DISCONTINUED | OUTPATIENT
Start: 2017-04-22 | End: 2017-04-23 | Stop reason: HOSPADM

## 2017-04-22 RX ORDER — NAPROXEN 500 MG/1
500 TABLET ORAL EVERY 8 HOURS PRN
Status: DISCONTINUED | OUTPATIENT
Start: 2017-04-22 | End: 2017-04-23 | Stop reason: HOSPADM

## 2017-04-22 RX ORDER — DOCUSATE SODIUM 100 MG/1
200 CAPSULE, LIQUID FILLED ORAL 2 TIMES DAILY PRN
Status: DISCONTINUED | OUTPATIENT
Start: 2017-04-22 | End: 2017-04-23 | Stop reason: HOSPADM

## 2017-04-22 RX ORDER — DIPHENHYDRAMINE HYDROCHLORIDE 50 MG/ML
25 INJECTION INTRAMUSCULAR; INTRAVENOUS EVERY 4 HOURS PRN
Status: DISCONTINUED | OUTPATIENT
Start: 2017-04-22 | End: 2017-04-23 | Stop reason: HOSPADM

## 2017-04-22 RX ORDER — OXYCODONE AND ACETAMINOPHEN 10; 325 MG/1; MG/1
1 TABLET ORAL EVERY 4 HOURS PRN
Status: DISCONTINUED | OUTPATIENT
Start: 2017-04-22 | End: 2017-04-23 | Stop reason: HOSPADM

## 2017-04-22 RX ORDER — IBUPROFEN 600 MG/1
600 TABLET ORAL 3 TIMES DAILY
Qty: 30 TABLET | Refills: 2 | Status: SHIPPED | OUTPATIENT
Start: 2017-04-22 | End: 2018-03-06

## 2017-04-22 RX ORDER — AMMONIA 15 % (W/V)
0.3 AMPUL (EA) INHALATION CONTINUOUS PRN
Status: DISCONTINUED | OUTPATIENT
Start: 2017-04-22 | End: 2017-04-23 | Stop reason: HOSPADM

## 2017-04-22 RX ORDER — ONDANSETRON 8 MG/1
8 TABLET, ORALLY DISINTEGRATING ORAL EVERY 8 HOURS PRN
Status: DISCONTINUED | OUTPATIENT
Start: 2017-04-22 | End: 2017-04-23 | Stop reason: HOSPADM

## 2017-04-22 RX ADMIN — OXYCODONE HYDROCHLORIDE AND ACETAMINOPHEN 1 TABLET: 10; 325 TABLET ORAL at 12:04

## 2017-04-22 RX ADMIN — OXYCODONE HYDROCHLORIDE AND ACETAMINOPHEN 1 TABLET: 10; 325 TABLET ORAL at 01:04

## 2017-04-22 RX ADMIN — NAPROXEN 500 MG: 500 TABLET ORAL at 09:04

## 2017-04-22 RX ADMIN — OXYCODONE HYDROCHLORIDE AND ACETAMINOPHEN 1 TABLET: 10; 325 TABLET ORAL at 05:04

## 2017-04-22 RX ADMIN — NAPROXEN 500 MG: 500 TABLET ORAL at 08:04

## 2017-04-22 RX ADMIN — OXYCODONE HYDROCHLORIDE AND ACETAMINOPHEN 1 TABLET: 10; 325 TABLET ORAL at 11:04

## 2017-04-22 RX ADMIN — OXYCODONE HYDROCHLORIDE AND ACETAMINOPHEN 1 TABLET: 10; 325 TABLET ORAL at 04:04

## 2017-04-22 RX ADMIN — NAPROXEN 500 MG: 500 TABLET ORAL at 12:04

## 2017-04-22 NOTE — NURSING
Pt ambulating, voiding, and eating without difficulty. Resting quietly at this time with family at bedside.

## 2017-04-22 NOTE — PROGRESS NOTES
[facility]  Obstetrics  Postpartum Progress Note    Patient Name: Dannielle De Jesus  MRN: [id number]  Admission Date: 2017  Hospital Length of Stay: 1 days  Attending Physician: Jessica Carrillo MD  Primary Care Provider: Primary Doctor No    Subjective:     Principal Problem: (spontaneous vaginal delivery)    Hospital Course:       Interval History:  Dannielle De Jesus is a 21 y.o. female PPD #0 status post Spontaneous vaginal delivery at 37w4d in a pregnancy complicated by GBS treated during labor. Patient is doing well this morning. She denies nausea, vomiting, fever or chills.  Patient reports mild abdominal pain that is well relieved by oral pain medications. Lochia is mild and stable. Patient has voided but has not been up ambulating yet since delivery. She has passed flatus, and has not had BM.  Patient does plan to breast feed. Did not discuss contraception. She desires circumcision.     Objective:     Vital Signs (Most Recent):  Temp: 98.1 °F (36.7 °C) (17 0735)  Pulse: 78 (17 0735)  Resp: 18 (17 0735)  BP: (!) 100/57 (17 0735)  SpO2: 98 % (17 0735) Vital Signs (24h Range):  Temp:  [96.8 °F (36 °C)-98.3 °F (36.8 °C)] 98.1 °F (36.7 °C)  Pulse:  [] 78  Resp:  [16-18] 18  SpO2:  [87 %-100 %] 98 %  BP: ()/(55-80) 100/57     Weight: 73 kg (160 lb 15 oz)  Body mass index is 29.44 kg/(m^2).      Intake/Output Summary (Last 24 hours) at 17 0848  Last data filed at 17 2251   Gross per 24 hour   Intake                0 ml   Output              100 ml   Net             -100 ml       Significant Labs:  Lab Results   Component Value Date    GROUPTRH O POS 2017    HEPBSAG Negative 2016       Recent Labs  Lab 17  0751   HGB 8.6*   HCT 25.3*     Post-partum CBC is ordered, but not yet drawn.    Physical Exam:   Constitutional: She is oriented to person, place, and time. She appears well-developed and well-nourished.     HENT:   Head: Normocephalic and atraumatic.    Eyes: Pupils are equal, round, and reactive to light.    Neck: Normal range of motion. Neck supple.    Cardiovascular: Normal rate, regular rhythm and normal heart sounds.  Exam reveals no gallop and no edema.    No murmur heard.   Pulmonary/Chest: Effort normal. No respiratory distress.        Abdominal: Soft. Bowel sounds are normal. She exhibits no distension. There is no rebound and no guarding.     Genitourinary:   Genitourinary Comments: Uterus feels firm below the umbilicus  Bleeding mild           Musculoskeletal: Normal range of motion and moves all extremeties.       Neurological: She is alert and oriented to person, place, and time.    Skin: Skin is warm and dry.    Psychiatric: She has a normal mood and affect. Her behavior is normal. Judgment and thought content normal.       Assessment/Plan:     21 y.o. female  at 37w4d for:    *  (spontaneous vaginal delivery)  - routine advances  - Regular diet: tolerating well  - H/h: post-partum labs to be draw 12+h post-delivery  - Rh positive  - Uterus firm  - Pain well controlled  - +flatus/-BM  - Lactation consult PRN  - desires circ: consents signed to chart, will wait until infant >24 hours old    - Plan for discharge to home on PPD #1-2      Carrier of group B Streptococcus  - s/p antibiotic therapy during labor      Disposition: As patient meets milestones, will plan to discharge PPD #2.    Jeni Mei MD  Obstetrics  [facility]      Patient seen and examined.  Agree with resident assessment and plan.  Twin Hebert Iv

## 2017-04-22 NOTE — DISCHARGE INSTRUCTIONS
Breastfeeding Discharge Instructions       Feed the baby at the earliest sign of hunger or comfort  o Hands to mouth, sucking motions  o Rooting or searching for something to suck on  o Dont wait for crying - it is a sign of distress     The feedings may be 8-12 times per 24hrs and will not follow a schedule   Avoid pacifiers and bottles for the first 4 weeks   Alternate the breast you start the feeding with, or start with the breast that feels the fullest   Switch breasts when the baby takes himself off the breast or falls asleep   Keep offering breasts until the baby looks full, no longer gives hunger signs, and stays asleep when placed on his back in the crib   If the baby is sleepy and wont wake for a feeding, put the baby skin-to-skin dressed in a diaper against the mothers bare chest   Sleep near your baby   The baby should be positioned and latched on to the breast correctly  o Chest-to-chest, chin in the breast  o Babys lips are flipped outward  o Babys mouth is stretched open wide like a shout  o Babys sucking should feel like tugging to the mother  - The baby should be drinking at the breast:  o You should hear swallowing or gulping throughout the feeding  o You should see milk on the babys lips when he comes off the breast  o Your breasts should be softer when the baby is finished feeding  o The baby should look relaxed at the end of feedings  o After the 4th day and your milk is in:  o The babys poop should turn bright yellow and be loose, watery, and seedy  o The baby should have at least 3-4 poops the size of the palm of your hand per day  o The baby should have at least 5-6 wet diapers per day  o The urine should be light yellow in color  You should drink when you are thirsty and eat a healthy diet when you are    hungry.     Take naps to get the rest you need.   Take medications and/or drink alcohol only with permission of your obstetrician    or the babys pediatrician.  You can  also call the Infant Risk Center,   ([phone]), Monday-Friday, 8am-5pm Central time, to get the most   up-to-date evidence-based information on the use of medications during   pregnancy and breastfeeding.      The baby should be examined by a pediatrician at 3-5 days of age.  Once your   milk comes in, the baby should be gaining at least ½ - 1oz each day and should be back to birthweight no later than 10-14 days of age.          Community Resources    [facility] Breastfeeding Warmline: [phone]   Local [facility] clinics: provide incentives and breastpumps to eligible mothers  La Leche Lekathryn International (LLLI):  mother-to-mother support group website        www.[employer]Yamsafer  Local La Leche League mother-to-mother support groups:        www.The Wireless Registry        La Leche League [facility]   Dr. Chapito Canales website for latch videos and general information:        www.breastfeedinginc.ca  Infant Risk Center is a call center that provides information about the safety of taking medications while breastfeeding.  Call [phone], M-F, 8am-5pm, [place].  International Lactation Consultant Association provides resources for assistance:        www.ilca.org  [facility] Breastfeeding Coalition provides informationand resources for parents  and the community    http://[facility]astfeeding.org     Jennifer Telles is a mom-to-mom support group:                             www.Mobov[employer].com//breastfeedng-support/  Partners for Healthy Babies:  8-491-358-BABY(4525)  [employer] au Lait: a breastfeeding support group for women of color, [phone]

## 2017-04-22 NOTE — LACTATION NOTE
04/22/17 1000   Maternal Infant Feeding   Maternal Emotional State relaxed   Time Spent (min) 0-15 min   Breastfeeding Education adequate infant intake;importance of skin-to-skin contact   Lactation Referrals   Lactation Consult Knowledge deficit   Lactation Interventions   Attachment Promotion counseling provided;family involvement promoted;role responsibility promoted;rooming-in promoted;skin-to-skin contact encouraged   Breastfeeding Assistance feeding cue recognition promoted;support offered   Maternal Breastfeeding Support encouragement offered;lactation counseling provided;maternal hydration promoted;maternal nutrition promoted;maternal rest encouraged   Praised patient for breastfeeding; requested she call lactation for assistance with feeding; provided basic lactation education;

## 2017-04-22 NOTE — L&D DELIVERY NOTE
Delivery Information for  Chevy De Jesus    Birth information:  YOB: 2019   Time of birth: 10:51 PM   Sex: male   Head Delivery Date/Time: 2017 10:50 PM   Delivery type: Vaginal, Spontaneous Delivery   Gestational Age: 37w4d    Delivery Providers    Delivering clinician:  CHEN BANUELOS   Other personnel:   Provider Role   MAC DAMON Resident   VANNESSA OLMSTEAD Delivery Nurse   LORRIE BARTH Charge Nurse                       Assessment    Living status:  Yes   Apgars    1 Minute:   5 Minute:   10 Minute 15 Minute 20 Minute   Skin Color: 1  1       Heart Rate: 2  2       Reflex Irritability: 2  2       Muscle Tone: 2  2       Respiratory Effort: 2  2       Total: 9  9                  Apgars Assigned By:  NURSERY          Assisted Delivery Details:    Forceps attempted?:  No   Vacuum extractor attempted?:  No             Shoulder Dystocia    Shoulder dystocia present?:  Yes   Time recognized:  2017     Physician/Provider:  Dr. Banuelos          First maneuver:  Yadi maneuver, suprapubic pressure   Time performed:  2017    Performed by:  Vannessa Olmstead                                         Presentation and Position    Presentation: Vertex   Position:                    Interventions/Resuscitation    Method:  None        Cord    Vessels:  3 vessels   Complications:  None   Delayed Cord Clamping?:  No   Cord Clamped Date/Time:  2017 10:51 PM   Cord Blood Disposition:  Sent with Baby   Gases Sent?:  Yes   Stem Cell Collection (by MD):  No        Placenta    Date and time:  2017 10:56 PM   Removal:  Spontaneous   Appearance:  Intact   Placenta disposition:  Discarded            Labor Events:       labor: No     Labor Onset Date/Time:         Dilation Complete Date/Time:         Start Pushing Date/Time: 2017 22:42     Rupture Date/Time:              Rupture type:           Fluid Amount:        Fluid Color:        Fluid Odor:         Membrane Status (PeriCalm): SRM (Spontaneous Rupture)      Rupture Date/Time (PeriCalm):        Fluid Amount (PeriCalm):        Fluid Color (PeriCalm): Clear       steroids: None     Antibiotics given for GBS: Yes     Induction: none     Indications for induction:        Augmentation: oxytocin     Indications for augmentation:       Labor complications: Shoulder Dystocia     Additional complications:          Cervical ripening:                     Delivery:      Episiotomy: None     Indication for Episiotomy:       Perineal Lacerations: None Repaired:      Periurethral Laceration: none Repaired:     Labial Laceration: none Repaired:     Sulcus Laceration: none Repaired:     Vaginal Laceration: No Repaired:     Cervical Laceration: No Repaired:     Repair suture: None     Repair # of packets:       Blood loss (ml): 100     Vaginal Sweep Performed: Yes     Surgicount Correct: No       Other providers:       Anesthesia    Method:  None              Details (if applicable):  Trial of Labor      Categorization:      Priority:     Indications for :     Incision Type:       Additional  information:  Forceps:    Vacuum:    Breech:    Observed anomalies    Other (Comments):            cephalic after approximately 5 minutes of maternal pushing.  No epidural was in place  This delivery was complicated by a shoulder dystocia    · After the head delivered, the left shoulder was noted to be anterior and held behind the pubic symphysis.  · Immediately at the identification of dystocia, I asked the nurse to note the time and call assistance to the room including additional residents, nursing staff and peditrics  ·  There was enough room for manuevers and an episiotomy was not required.  · Maneuvers Required to relieve the dystocia included: Yadi, suprapubic pressure and posterior arm delivery  · Cord gases were sent  · Pediatricians were in room at delivery  · APGARS were 9 at  1 minute and 9 at 5 minutes  · Birthweight No birth weight on file.  ·  was moving both arms without evidence of nerve injury at the time of delivery  · There was not evidence of clavicular fracture  · The Shoulder dystocia lasted a total of less than 1 minute.      Infant delivered OA over intact perineum.  Male infant also tolerated the delivery well and was placed on mothers abdomen for skin to skin and bulb suctioning performed.  Cord clamped and cut.  Umbilical arterial gas and venous blood obtained.  Left periurethral abrasion found to be hemostatic.  Placenta delivered spontaneously and IV pitocin given.  Uterine tone noted. No cervical lacerations.  Patient tolerated delivery well.  EBL 100cc  Staff present for entire procedure.  S/L/N counts correct x2.      Sahra Ardon MD  PGY-3 OB/GYN  [phone]

## 2017-04-22 NOTE — DISCHARGE SUMMARY
Delivery Discharge Summary  Obstetrics      Primary OB Clinician: Gerardo    Admission date: 2017  Discharge date: 2017    Disposition: To home, self care    Admit Dx:      Patient Active Problem List   Diagnosis    History of  delivery    History of GBS UTI at outside facility; PCN in labor    Normal pregnancy in third trimester    Threatened  labor     uterine contractions    Flu    Rupture of membranes with clear amniotic fluid    Carrier of group B Streptococcus     (spontaneous vaginal delivery)     Discharge Dx:    Patient Active Problem List   Diagnosis    History of  delivery    History of GBS UTI at outside facility; PCN in labor    Normal pregnancy in third trimester    Threatened  labor     uterine contractions    Flu    Rupture of membranes with clear amniotic fluid    Carrier of group B Streptococcus     (spontaneous vaginal delivery)       Procedure:     Hospital Course:  Dannielle De Jesus is a 21 y.o. now , PPD #2 who was admitted on 2017 at 37.4 wga for PROM. On initial assessment, vital signs were stable and physical exam was notable for leakage of amniotic fluid on speculum exam. Infant was in cephalic presentation. Patient was subsequently admitted to labor and delivery unit with signed consents. Labor course was managed with pitocin.  Patient delivered a single viable  male. Please see delivery note for further details. Pt was in stable condition post delivery and was transferred to the Mother-Baby Unit. Her postpartum course was uncomplicated. On discharge day, patient's pain is controlled with oral pain medications. Pt is tolerating ambulation without SOB or CP, and PO diet without N/V. Reports lochia is mild. Denies any HA, vision changes, F/C, LE swelling. Denies any breast pain/soreness.  Pt in stable condition and ready for discharge. She has been instructed to continue home medications as  indicated as well as pain medications as needed and to follow up in the OB clinic in 6 weeks with her obstetrics provider.    Pertinent studies:  Postpartum CBC  Lab Results   Component Value Date    WBC 12.73 (H) 2017    HGB 8.6 (L) 2017    HCT 25.3 (L) 2017    MCV 81 (L) 2017     2017       Tubal Ligation: n/a  Feeding Method: breast  Rh Immune Globulin Given(O POS): N/A  Rubella Vaccine Given: N/A  Tdap Vaccine Given: 3/15/17    Delivery:    Episiotomy: None   Lacerations: None   Repair suture: None   Repair # of packets:     Blood loss (ml): 100     Birth information:  YOB: 2019   Time of birth: 10:51 PM   Sex: male   Delivery type: Vaginal, Spontaneous Delivery   Gestational Age: 37w4d    Delivery Clinician:      Other providers:       Additional  information:  Forceps:    Vacuum:    Breech:    Observed anomalies      Living?:           APGARS  One minute Five minutes Ten minutes   Skin color:         Heart rate:         Grimace:         Muscle tone:         Breathing:         Totals: 9  9        Placenta: Delivered:       appearance      Patient Instructions:   Current Discharge Medication List      START taking these medications    Details   ibuprofen (ADVIL,MOTRIN) 600 MG tablet Take 1 tablet (600 mg total) by mouth 3 (three) times daily.  Qty: 30 tablet, Refills: 2    Associated Diagnoses: Rupture of membranes with clear amniotic fluid; 37 weeks gestation of pregnancy;  (spontaneous vaginal delivery)         CONTINUE these medications which have NOT CHANGED    Details   ferrous sulfate 325 mg (65 mg iron) Tab tablet Take 1 tablet (325 mg total) by mouth 2 (two) times daily.  Qty: 60 tablet, Refills: 3    Associated Diagnoses: Iron deficiency anemia secondary to inadequate dietary iron intake      prenatal #108-iron,carbonyl-FA 30-1 mg Tab Take by mouth once daily.               Discharge Procedure Orders  Diet general     Activity as tolerated     Call  MD for:  temperature >100.4     Call MD for:  persistent nausea and vomiting or diarrhea     Call MD for:  severe uncontrolled pain     Call MD for:  redness, tenderness, or signs of infection (pain, swelling, redness, odor or green/yellow discharge around incision site)     Call MD for:  difficulty breathing or increased cough     Call MD for:  severe persistent headache     Call MD for:  persistent dizziness, light-headedness, or visual disturbances     Call MD for:  increased confusion or weakness         Follow-up Information     Follow up with Jessica Carrillo MD In 6 weeks.    Specialty:  Obstetrics and Gynecology    Why:  Post-partum check-up    Contact information:    [address] [facility] [id number] [phone]          Gary Doyle MD  PGY-1 OB/GYN  [phone]

## 2017-04-22 NOTE — SUBJECTIVE & OBJECTIVE
Interval History:  Dannielle De Jesus is a 21 y.o. female PPD #0 status post Spontaneous vaginal delivery at 37w4d in a pregnancy complicated by GBS treated during labor. Patient is doing well this morning. She denies nausea, vomiting, fever or chills.  Patient reports mild abdominal pain that is well relieved by oral pain medications. Lochia is mild and stable. Patient has voided but has not been up ambulating yet since delivery. She has passed flatus, and has not had BM.  Patient does plan to breast feed. Did not discuss contraception. She desires circumcision.     Objective:     Vital Signs (Most Recent):  Temp: 98.1 °F (36.7 °C) (04/22/17 0735)  Pulse: 78 (04/22/17 0735)  Resp: 18 (04/22/17 0735)  BP: (!) 100/57 (04/22/17 0735)  SpO2: 98 % (04/22/17 0735) Vital Signs (24h Range):  Temp:  [96.8 °F (36 °C)-98.3 °F (36.8 °C)] 98.1 °F (36.7 °C)  Pulse:  [] 78  Resp:  [16-18] 18  SpO2:  [87 %-100 %] 98 %  BP: ()/(55-80) 100/57     Weight: 73 kg (160 lb 15 oz)  Body mass index is 29.44 kg/(m^2).      Intake/Output Summary (Last 24 hours) at 04/22/17 0848  Last data filed at 04/21/17 2251   Gross per 24 hour   Intake                0 ml   Output              100 ml   Net             -100 ml       Significant Labs:  Lab Results   Component Value Date    GROUPTRH O POS 04/21/2017    HEPBSAG Negative 09/27/2016       Recent Labs  Lab 04/22/17  0751   HGB 8.6*   HCT 25.3*     Post-partum CBC is ordered, but not yet drawn.    Physical Exam:   Constitutional: She is oriented to person, place, and time. She appears well-developed and well-nourished.    HENT:   Head: Normocephalic and atraumatic.    Eyes: Pupils are equal, round, and reactive to light.    Neck: Normal range of motion. Neck supple.    Cardiovascular: Normal rate, regular rhythm and normal heart sounds.  Exam reveals no gallop and no edema.    No murmur heard.   Pulmonary/Chest: Effort normal. No respiratory distress.        Abdominal: Soft. Bowel  sounds are normal. She exhibits no distension. There is no rebound and no guarding.     Genitourinary:   Genitourinary Comments: Uterus feels firm below the umbilicus  Bleeding mild           Musculoskeletal: Normal range of motion and moves all extremeties.       Neurological: She is alert and oriented to person, place, and time.    Skin: Skin is warm and dry.    Psychiatric: She has a normal mood and affect. Her behavior is normal. Judgment and thought content normal.

## 2017-04-22 NOTE — PLAN OF CARE
Problem: Patient Care Overview  Goal: Plan of Care Review  Outcome: Ongoing (interventions implemented as appropriate)    Praised patient for breastfeeding; requested she call lactation for latch assistance; provided basic lactation education; developed the following breastfeeding plan of care with her: she will breastfeed baby on cue until content at least 8 times in 24 hours observing for signs of milk transfer; she will wake baby prn; she will avoid bottles, formula and pacifiers;

## 2017-04-22 NOTE — ASSESSMENT & PLAN NOTE
- routine advances  - Regular diet: tolerating well  - H/h: post-partum labs to be draw 12+h post-delivery  - Rh positive  - Uterus firm  - Pain well controlled  - +flatus/-BM  - Lactation consult PRN  - desires circ: consents signed to chart, will wait until infant >24 hours old    - Plan for discharge to home on PPD #1-2

## 2017-04-22 NOTE — PROGRESS NOTES
"LABOR NOTE    S:  Complaints: No.  Epidural working:  yes    O: /63 (BP Location: Left arm, Patient Position: Lying, BP Method: Automatic)  Pulse 87  Temp 98.2 °F (36.8 °C) (Temporal)   Resp 18  Ht 5' 2" (1.575 m)  Wt 73 kg (160 lb 15 oz)  LMP 2016  SpO2 98%  Breastfeeding? No  BMI 29.44 kg/m2      FHT: 140s/mod stephenie/+accels/-decels Cat 1 (reassuring)  CTX: q 2-3 minutes  SVE: /-2  FOrebag ruptured  IUPC placed      ASSESSMENT:   21 y.o.  at 37w4d, PROM    FHT reassuring    Active Hospital Problems    Diagnosis  POA    *Rupture of membranes with clear amniotic fluid [O42.019]  Yes    Carrier of group B Streptococcus [Z22.330]  Not Applicable    37 weeks gestation of pregnancy [Z3A.37]  Not Applicable      Resolved Hospital Problems    Diagnosis Date Resolved POA   No resolved problems to display.         PLAN:    Continue Close Maternal/Fetal Monitoring  Pitocin Augmentation per protocol  Recheck 2 hours or PRN    Charlotte Stovall MD  PGY-4  Pager: [phone]      "

## 2017-04-23 VITALS
TEMPERATURE: 98 F | WEIGHT: 160.94 LBS | HEART RATE: 67 BPM | BODY MASS INDEX: 29.62 KG/M2 | RESPIRATION RATE: 18 BRPM | OXYGEN SATURATION: 98 % | SYSTOLIC BLOOD PRESSURE: 111 MMHG | HEIGHT: 62 IN | DIASTOLIC BLOOD PRESSURE: 69 MMHG

## 2017-04-23 PROCEDURE — 72100002 HC LABOR CARE, 1ST 8 HOURS

## 2017-04-23 PROCEDURE — 25000003 PHARM REV CODE 250: Performed by: OBSTETRICS & GYNECOLOGY

## 2017-04-23 RX ORDER — OXYCODONE AND ACETAMINOPHEN 5; 325 MG/1; MG/1
1 TABLET ORAL EVERY 4 HOURS PRN
Qty: 10 TABLET | Refills: 0 | Status: SHIPPED | OUTPATIENT
Start: 2017-04-23 | End: 2018-03-06

## 2017-04-23 RX ADMIN — OXYCODONE HYDROCHLORIDE AND ACETAMINOPHEN 1 TABLET: 10; 325 TABLET ORAL at 05:04

## 2017-04-23 NOTE — PLAN OF CARE
Problem: Breastfeeding (Adult,Obstetrics,Pediatric)  Goal: Signs and Symptoms of Listed Potential Problems Will be Absent, Minimized or Managed (Breastfeeding)  Signs and symptoms of listed potential problems will be absent, minimized or managed by discharge/transition of care (reference Breastfeeding (Adult,Obstetrics,Pediatric) CPG).   Outcome: Ongoing (interventions implemented as appropriate)    17 0900   Breastfeeding   Problems Assessed (Breastfeeding) all   Problems Present (Breastfeeding) none   Lactation POC for DC discussed. Frequency, duration, I and O, what to expect with nursing near term , early peds FU, pt acknowledged understanding. Dyad nursing well with swallows noted. Questions answered.

## 2017-04-23 NOTE — LACTATION NOTE
"   04/23/17 0900   Maternal Infant Assessment   Breast Shape pendulous   Breast Density filling   Areola elastic   Nipple(s) everted   Infant Assessment   Sucking Reflex present   Rooting Reflex present   Swallow Reflex present   Skin Color pink   LATCH Score   Latch 2-->grasps breast, tongue down, lips flanged, rhythmic sucking   Audible Swallowing 2-->spontaneous and intermittent (24 hrs old)   Type Of Nipple 2-->everted (after stimulation)   Comfort (Breast/Nipple) 2-->soft/nontender   Hold (Positioning) 1-->minimal assist, teach one side: mother does other, staff holds   Score (less than 7 for 2/more consecutive times, consult Lactation Consultant) 9       Number Scale   Presence of Pain denies   Maternal Infant Feeding   Maternal Emotional State relaxed   Infant Positioning clutch/"football"   Signs of Milk Transfer audible swallow;infant jaw motion present   Time Spent (min) 15-30 min   Latch Assistance yes   Feeding Infant   Effective Latch During Feeding yes   Audible Swallow yes   Lactation Interventions   Breast Care: Breastfeeding milk massaged towards nipple   Breastfeeding Assistance assisted with positioning;feeding cue recognition promoted;feeding on demand promoted;feeding session observed;infant latch-on verified   Maternal Breastfeeding Support encouragement offered   Lactation observation with slight positioning adjusted. Dyad nursing well.  "

## 2017-04-23 NOTE — PROGRESS NOTES
[facility]  Obstetrics  Postpartum Progress Note    Patient Name: Dannielle De Jesus  MRN: [id number]  Admission Date: 2017  Hospital Length of Stay: 2 days  Attending Physician: Jessica Carrillo MD  Primary Care Provider: Primary Doctor No    Subjective:     Principal Problem: (spontaneous vaginal delivery)    Hospital Course:       Interval History:  Dannielle De Jesus is a 21 y.o. female PPD #1 status post Spontaneous vaginal delivery at 37w4d in a pregnancy complicated by GBS treated during labor. Patient is doing well this morning. She denies nausea, vomiting, fever or chills.  Patient reports mild abdominal pain that is well relieved by oral pain medications. Lochia is mild and stable. Patient has voided but has not been up ambulating yet since delivery. She has passed flatus, and has not had BM.  Patient does plan to breast feed. Did not discuss contraception. She desires circumcision.     Objective:     Vital Signs (Most Recent):  Temp: 97.9 °F (36.6 °C) (17)  Pulse: 80 (17)  Resp: 18 (17)  BP: (!) 107/56 (17)  SpO2: 97 % (17) Vital Signs (24h Range):  Temp:  [97.9 °F (36.6 °C)-98.2 °F (36.8 °C)] 97.9 °F (36.6 °C)  Pulse:  [78-80] 80  Resp:  [18] 18  SpO2:  [97 %-99 %] 97 %  BP: ()/(54-57) 107/56     Weight: 73 kg (160 lb 15 oz)  Body mass index is 29.44 kg/(m^2).    No intake or output data in the 24 hours ending 17 0649    Significant Labs:  Lab Results   Component Value Date    GROUPTRH O POS 2017    HEPBSAG Negative 2016       Recent Labs  Lab 17  0751   HGB 8.6*   HCT 25.3*       I have personallly reviewed all pertinent lab results from the last 24 hours.    Physical Exam:   Constitutional: She is oriented to person, place, and time. She appears well-developed and well-nourished.    HENT:   Head: Normocephalic and atraumatic.    Eyes: Pupils are equal, round, and reactive to light.     Neck: Normal range of motion. Neck supple.    Cardiovascular: Normal rate, regular rhythm and normal heart sounds.  Exam reveals no gallop and no edema.    No murmur heard.   Pulmonary/Chest: Effort normal. No respiratory distress.        Abdominal: Soft. Bowel sounds are normal. She exhibits no distension. There is no rebound and no guarding.     Genitourinary:   Genitourinary Comments: Uterus feels firm below the umbilicus  Bleeding mild           Musculoskeletal: Normal range of motion and moves all extremeties.       Neurological: She is alert and oriented to person, place, and time.    Skin: Skin is warm and dry.    Psychiatric: She has a normal mood and affect. Her behavior is normal. Judgment and thought content normal.       Assessment/Plan:     21 y.o. female  PPD #2 for:    *  (spontaneous vaginal delivery)  - routine advances  - Regular diet: tolerating well  - H/h: post-partum labs to be draw 12+h post-delivery  - Rh positive  - Uterus firm  - Pain well controlled  - +flatus/-BM  - Lactation consult PRN  - desires circ: consents signed to chart    - Plan for discharge to home on PPD #2        Disposition: As patient meets milestones, will plan to discharge PPD #2.    Anup Doyle MD  Obstetrics  [facility]

## 2017-04-23 NOTE — SUBJECTIVE & OBJECTIVE
Interval History:  Dannielle De Jesus is a 21 y.o. female PPD #1 status post Spontaneous vaginal delivery at 37w4d in a pregnancy complicated by GBS treated during labor. Patient is doing well this morning. She denies nausea, vomiting, fever or chills.  Patient reports mild abdominal pain that is well relieved by oral pain medications. Lochia is mild and stable. Patient has voided but has not been up ambulating yet since delivery. She has passed flatus, and has not had BM.  Patient does plan to breast feed. Did not discuss contraception. She desires circumcision.     Objective:     Vital Signs (Most Recent):  Temp: 97.9 °F (36.6 °C) (04/23/17 0031)  Pulse: 80 (04/23/17 0031)  Resp: 18 (04/23/17 0031)  BP: (!) 107/56 (04/23/17 0031)  SpO2: 97 % (04/23/17 0031) Vital Signs (24h Range):  Temp:  [97.9 °F (36.6 °C)-98.2 °F (36.8 °C)] 97.9 °F (36.6 °C)  Pulse:  [78-80] 80  Resp:  [18] 18  SpO2:  [97 %-99 %] 97 %  BP: ()/(54-57) 107/56     Weight: 73 kg (160 lb 15 oz)  Body mass index is 29.44 kg/(m^2).    No intake or output data in the 24 hours ending 04/23/17 0649    Significant Labs:  Lab Results   Component Value Date    GROUPTRH O POS 04/21/2017    HEPBSAG Negative 09/27/2016       Recent Labs  Lab 04/22/17  0751   HGB 8.6*   HCT 25.3*       I have personallly reviewed all pertinent lab results from the last 24 hours.    Physical Exam:   Constitutional: She is oriented to person, place, and time. She appears well-developed and well-nourished.    HENT:   Head: Normocephalic and atraumatic.    Eyes: Pupils are equal, round, and reactive to light.    Neck: Normal range of motion. Neck supple.    Cardiovascular: Normal rate, regular rhythm and normal heart sounds.  Exam reveals no gallop and no edema.    No murmur heard.   Pulmonary/Chest: Effort normal. No respiratory distress.        Abdominal: Soft. Bowel sounds are normal. She exhibits no distension. There is no rebound and no guarding.     Genitourinary:    Genitourinary Comments: Uterus feels firm below the umbilicus  Bleeding mild           Musculoskeletal: Normal range of motion and moves all extremeties.       Neurological: She is alert and oriented to person, place, and time.    Skin: Skin is warm and dry.    Psychiatric: She has a normal mood and affect. Her behavior is normal. Judgment and thought content normal.

## 2017-04-23 NOTE — ASSESSMENT & PLAN NOTE
- routine advances  - Regular diet: tolerating well  - H/h: post-partum labs to be draw 12+h post-delivery  - Rh positive  - Uterus firm  - Pain well controlled  - +flatus/-BM  - Lactation consult PRN  - desires circ: consents signed to chart    - Plan for discharge to home on PPD #2

## 2017-04-23 NOTE — PLAN OF CARE
Problem: Breastfeeding (Adult,Obstetrics,Pediatric)  Intervention: Provide Support During Feeding Sessions  VVS, ambulating and voiding without difficulty, vaginal bleeding light, breast feeding without difficulty, discuss plan of care with pt, pt verbalized understanding.

## 2017-04-29 ENCOUNTER — PATIENT MESSAGE (OUTPATIENT)
Dept: OBSTETRICS AND GYNECOLOGY | Facility: CLINIC | Age: 22
End: 2017-04-29

## 2017-05-06 ENCOUNTER — PATIENT MESSAGE (OUTPATIENT)
Dept: OBSTETRICS AND GYNECOLOGY | Facility: CLINIC | Age: 22
End: 2017-05-06

## 2017-06-07 ENCOUNTER — CLINICAL SUPPORT (OUTPATIENT)
Dept: INFECTIOUS DISEASES | Facility: CLINIC | Age: 22
End: 2017-06-07
Payer: MEDICAID

## 2017-06-07 ENCOUNTER — OFFICE VISIT (OUTPATIENT)
Dept: OBSTETRICS AND GYNECOLOGY | Facility: CLINIC | Age: 22
End: 2017-06-07
Payer: MEDICAID

## 2017-06-07 VITALS
HEIGHT: 62 IN | BODY MASS INDEX: 26.74 KG/M2 | WEIGHT: 145.31 LBS | DIASTOLIC BLOOD PRESSURE: 85 MMHG | SYSTOLIC BLOOD PRESSURE: 115 MMHG

## 2017-06-07 DIAGNOSIS — Z23 NEED FOR HPV VACCINATION: ICD-10-CM

## 2017-06-07 DIAGNOSIS — Z30.011 ENCOUNTER FOR INITIAL PRESCRIPTION OF CONTRACEPTIVE PILLS: ICD-10-CM

## 2017-06-07 PROCEDURE — 99211 OFF/OP EST MAY X REQ PHY/QHP: CPT | Mod: PBBFAC,27

## 2017-06-07 PROCEDURE — 90651 9VHPV VACCINE 2/3 DOSE IM: CPT | Mod: PBBFAC

## 2017-06-07 PROCEDURE — 99999 PR PBB SHADOW E&M-EST. PATIENT-LVL I: CPT | Mod: PBBFAC,,,

## 2017-06-07 PROCEDURE — 99999 PR PBB SHADOW E&M-EST. PATIENT-LVL III: CPT | Mod: PBBFAC,,, | Performed by: OBSTETRICS & GYNECOLOGY

## 2017-06-07 RX ORDER — ACETAMINOPHEN AND CODEINE PHOSPHATE 120; 12 MG/5ML; MG/5ML
1 SOLUTION ORAL DAILY
Qty: 30 TABLET | Refills: 11 | Status: SHIPPED | OUTPATIENT
Start: 2017-06-07 | End: 2018-03-06

## 2017-06-07 NOTE — PROGRESS NOTES
Subjective:       Patient ID: Dannielle De Jesus is a 21 y.o. female.    Chief Complaint:  Postpartum Care (2017)      History of Present Illness       Dannielle De Jesus is a 21 y.o. female  presents for a postpartum visit.  She is status post  with a Shoulder Dystocia 6 weeks ago.  Her hospitalization was not complicated.  She is breastfeeding.  She desires oral contraceptives for contraception.  She denies postpartum depression.    Her last pap was , normal    Review of Systems  Review of Systems   Constitutional: Negative for appetite change.   Eyes: Negative for visual disturbance.   Respiratory: Negative for cough and shortness of breath.    Cardiovascular: Negative for chest pain.   Gastrointestinal: Negative for abdominal pain, constipation and diarrhea.   Genitourinary: Negative for difficulty urinating, dysuria, frequency, genital sores, pelvic pain, vaginal bleeding, vaginal discharge and vaginal pain.   Neurological: Negative for dizziness, light-headedness and headaches.   Psychiatric/Behavioral: Negative for dysphoric mood.           Objective:    Physical Exam   Constitutional: She is oriented to person, place, and time. She appears well-developed and well-nourished.   Pulmonary/Chest: Effort normal.   Abdominal: Soft.   Genitourinary: Vagina normal and uterus normal. No labial fusion. There is no rash, tenderness, lesion or injury on the right labia. There is no rash, tenderness, lesion or injury on the left labia. Uterus is not deviated, not enlarged, not fixed and not tender. Cervix exhibits no motion tenderness, no discharge and no friability. Right adnexum displays no mass, no tenderness and no fullness. Left adnexum displays no mass, no tenderness and no fullness. No erythema, tenderness or bleeding in the vagina. No foreign body in the vagina. No signs of injury around the vagina. No vaginal discharge found.   Neurological: She is alert and oriented to person, place, and  time.   Psychiatric: She has a normal mood and affect. Her behavior is normal. Judgment and thought content normal.   Nursing note and vitals reviewed.        Assessment:        1. Postpartum state    2. Encounter for initial prescription of contraceptive pills    3. Need for HPV vaccination               Plan:        Orders Placed This Encounter   Procedures    HPV Vaccine (9-Valent) (3 Dose) (IM)       Medications Ordered This Encounter      norethindrone (ORTHO MICRONOR) 0.35 mg tablet    No post partum depression.   Pap up to date.   Micronor for contraception.   RTC 1 year for annual.   HPV vaccination ordered.     Return in about 1 year (around 6/7/2018) for annual or prn.    Jessica Carrillo MD  [facility]  [phone]

## 2017-10-26 ENCOUNTER — PATIENT MESSAGE (OUTPATIENT)
Dept: OBSTETRICS AND GYNECOLOGY | Facility: CLINIC | Age: 22
End: 2017-10-26

## 2017-11-27 ENCOUNTER — TELEPHONE (OUTPATIENT)
Dept: OBSTETRICS AND GYNECOLOGY | Facility: CLINIC | Age: 22
End: 2017-11-27

## 2018-03-06 ENCOUNTER — OFFICE VISIT (OUTPATIENT)
Dept: OBSTETRICS AND GYNECOLOGY | Facility: CLINIC | Age: 23
End: 2018-03-06
Payer: MEDICAID

## 2018-03-06 ENCOUNTER — LAB VISIT (OUTPATIENT)
Dept: LAB | Facility: OTHER | Age: 23
End: 2018-03-06
Attending: OBSTETRICS & GYNECOLOGY
Payer: MEDICAID

## 2018-03-06 VITALS
HEIGHT: 62 IN | DIASTOLIC BLOOD PRESSURE: 75 MMHG | SYSTOLIC BLOOD PRESSURE: 110 MMHG | WEIGHT: 165.56 LBS | BODY MASS INDEX: 30.47 KG/M2

## 2018-03-06 DIAGNOSIS — Z11.3 SCREEN FOR STD (SEXUALLY TRANSMITTED DISEASE): ICD-10-CM

## 2018-03-06 DIAGNOSIS — Z01.419 WELL WOMAN EXAM WITH ROUTINE GYNECOLOGICAL EXAM: Primary | ICD-10-CM

## 2018-03-06 DIAGNOSIS — N89.8 VAGINAL DISCHARGE: ICD-10-CM

## 2018-03-06 DIAGNOSIS — Z30.011 ENCOUNTER FOR INITIAL PRESCRIPTION OF CONTRACEPTIVE PILLS: ICD-10-CM

## 2018-03-06 LAB
CANDIDA RRNA VAG QL PROBE: NEGATIVE
G VAGINALIS RRNA GENITAL QL PROBE: POSITIVE
RPR SER QL: NORMAL
T VAGINALIS RRNA GENITAL QL PROBE: NEGATIVE

## 2018-03-06 PROCEDURE — 99212 OFFICE O/P EST SF 10 MIN: CPT | Mod: PBBFAC | Performed by: OBSTETRICS & GYNECOLOGY

## 2018-03-06 PROCEDURE — 87480 CANDIDA DNA DIR PROBE: CPT

## 2018-03-06 PROCEDURE — 80074 ACUTE HEPATITIS PANEL: CPT

## 2018-03-06 PROCEDURE — 88175 CYTOPATH C/V AUTO FLUID REDO: CPT | Performed by: PATHOLOGY

## 2018-03-06 PROCEDURE — 87491 CHLMYD TRACH DNA AMP PROBE: CPT

## 2018-03-06 PROCEDURE — 99999 PR PBB SHADOW E&M-EST. PATIENT-LVL II: CPT | Mod: PBBFAC,,, | Performed by: OBSTETRICS & GYNECOLOGY

## 2018-03-06 PROCEDURE — 36415 COLL VENOUS BLD VENIPUNCTURE: CPT

## 2018-03-06 PROCEDURE — 86703 HIV-1/HIV-2 1 RESULT ANTBDY: CPT

## 2018-03-06 PROCEDURE — 88141 CYTOPATH C/V INTERPRET: CPT | Mod: ,,, | Performed by: PATHOLOGY

## 2018-03-06 PROCEDURE — 86592 SYPHILIS TEST NON-TREP QUAL: CPT

## 2018-03-06 PROCEDURE — 99395 PREV VISIT EST AGE 18-39: CPT | Mod: S$PBB,,, | Performed by: OBSTETRICS & GYNECOLOGY

## 2018-03-06 NOTE — PROGRESS NOTES
History & Physical  Gynecology      SUBJECTIVE:     Chief Complaint: Well Woman       History of Present Illness:  Annual Exam-Premenopausal  Patient presents for annual exam. The patient has no complaints today other than some vaginal moisture and an odor. Menstrual cycles are once a month/regular.  The patient is sexually active. GYN screening history: last pap: approximate date  and was normal. The patient participates in regular exercise: no.  The patient does not smoke.      Contraception: none    FH:  Breast cancer: none  Colon cancer: none  Ovarian cancer: none    Review of patient's allergies indicates:  No Known Allergies    History reviewed. No pertinent past medical history.  History reviewed. No pertinent surgical history.  OB History      Para Term  AB Living    2 2 1 1   2    SAB TAB Ectopic Multiple Live Births          0 2        Family History   Problem Relation Age of Onset    Cancer Neg Hx      labor Neg Hx     Diabetes Neg Hx     Breast cancer Neg Hx     Colon cancer Neg Hx     Ovarian cancer Neg Hx      Social History   Substance Use Topics    Smoking status: Never Smoker    Smokeless tobacco: Never Used    Alcohol use Yes      Comment: occ.       Current Outpatient Prescriptions   Medication Sig    norgestrel-ethinyl estradiol (LO/OVRAL) 0.3-30 mg-mcg per tablet Take 1 tablet by mouth once daily.     No current facility-administered medications for this visit.          Review of Systems:  Review of Systems   Constitutional: Negative for appetite change, fever and unexpected weight change.   Respiratory: Negative for shortness of breath.    Cardiovascular: Negative for chest pain.   Gastrointestinal: Negative for nausea and vomiting.   Genitourinary: Positive for vaginal discharge and vaginal odor. Negative for menorrhagia, menstrual problem, pelvic pain, vaginal bleeding and vaginal pain.        OBJECTIVE:     Physical Exam:  Physical Exam   Constitutional:  She is oriented to person, place, and time. She appears well-developed and well-nourished.   Neck: Normal range of motion. Neck supple. No tracheal deviation present. No thyromegaly present.   Cardiovascular: Normal rate, regular rhythm and normal heart sounds.    Pulmonary/Chest: Effort normal and breath sounds normal. Right breast exhibits no inverted nipple, no mass, no nipple discharge, no skin change and no tenderness. Left breast exhibits no inverted nipple, no mass, no nipple discharge, no skin change and no tenderness. Breasts are symmetrical.   Abdominal: Soft.   Genitourinary: Vagina normal and uterus normal. No labial fusion. There is no rash, tenderness, lesion or injury on the right labia. There is no rash, tenderness, lesion or injury on the left labia. Uterus is not deviated, not enlarged, not fixed and not tender. Cervix exhibits no motion tenderness, no discharge and no friability. Right adnexum displays no mass, no tenderness and no fullness. Left adnexum displays no mass, no tenderness and no fullness. No erythema, tenderness or bleeding in the vagina. No foreign body in the vagina. No signs of injury around the vagina. No vaginal discharge found.   Neurological: She is alert and oriented to person, place, and time.   Psychiatric: She has a normal mood and affect. Her behavior is normal. Judgment and thought content normal.   Nursing note and vitals reviewed.      Chaperoned by: Jing    ASSESSMENT:       ICD-10-CM ICD-9-CM    1. Well woman exam with routine gynecological exam Z01.419 V72.31 Liquid-based pap smear, screening      C. trachomatis/N. gonorrhoeae by AMP DNA Cervix      HPV Vaccine (9-Valent) (3 Dose) (IM)   2. Vaginal discharge N89.8 623.5 Vaginosis Screen by DNA Probe   3. Encounter for initial prescription of contraceptive pills Z30.011 V25.01 norgestrel-ethinyl estradiol (LO/OVRAL) 0.3-30 mg-mcg per tablet   4. Screen for STD (sexually transmitted disease) Z11.3 V74.5 HIV-1 and  HIV-2 antibodies      RPR      Hepatitis panel, acute          Plan:      Dannielle was seen today for well woman.    Diagnoses and all orders for this visit:    Well woman exam with routine gynecological exam  -     Liquid-based pap smear, screening  -     C. trachomatis/N. gonorrhoeae by AMP DNA Cervix  -     HPV Vaccine (9-Valent) (3 Dose) (IM)    Vaginal discharge  -     Vaginosis Screen by DNA Probe    Encounter for initial prescription of contraceptive pills  -     norgestrel-ethinyl estradiol (LO/OVRAL) 0.3-30 mg-mcg per tablet; Take 1 tablet by mouth once daily.    Screen for STD (sexually transmitted disease)  -     HIV-1 and HIV-2 antibodies; Future  -     RPR; Future  -     Hepatitis panel, acute; Future        Orders Placed This Encounter   Procedures    C. trachomatis/N. gonorrhoeae by AMP DNA Cervix    Vaginosis Screen by DNA Probe    HPV Vaccine (9-Valent) (3 Dose) (IM)    HIV-1 and HIV-2 antibodies    RPR    Hepatitis panel, acute       Well Woman:  - Pap smear done today  - Birth control: lo ovral  - GC/CT:done today  - Mammogram: due age 38  - Smoking cessation: n/a  - Labs: HIV, Syphilis (RPR), Hepatitis    - Vaccines: third HPV today  - Exercise counseling    - The use of the estrogen containing contraception has been fully discussed with the patient. This includes the proper method to initiate and continue the drug, the need for regular compliance to ensure adequate contraceptive effect, the physiology which make the pill/patch/ring effective, the instructions for what to do in event of a missed or delayed dosage, and warnings about anticipated minor side effects such as breakthrough spotting, nausea, breast tenderness, weight changes, acne, headaches, etc.    - We also discussed the more serious potential side effects such as MI, stroke, and deep vein thrombosis, all of which are very unlikely.  She has been asked to report any signs of such serious problems immediately.  She was advised to  use back up during the first cycle. The need for additional protection, such as a condom, to prevent exposure to sexually transmitted diseases has also been discussed- the patient has been clearly reminded that the pill/patch/ring cannot protect her against diseases such as HIV and others. She understands and wishes to take the medication as prescribed.  :    Follow up in one year for annual or prn.    Jessica Carrillo

## 2018-03-07 ENCOUNTER — PATIENT MESSAGE (OUTPATIENT)
Dept: OBSTETRICS AND GYNECOLOGY | Facility: CLINIC | Age: 23
End: 2018-03-07

## 2018-03-07 DIAGNOSIS — N76.0 BV (BACTERIAL VAGINOSIS): Primary | ICD-10-CM

## 2018-03-07 DIAGNOSIS — B96.89 BV (BACTERIAL VAGINOSIS): Primary | ICD-10-CM

## 2018-03-07 LAB
C TRACH DNA SPEC QL NAA+PROBE: NOT DETECTED
HAV IGM SERPL QL IA: NEGATIVE
HBV CORE IGM SERPL QL IA: NEGATIVE
HBV SURFACE AG SERPL QL IA: NEGATIVE
HCV AB SERPL QL IA: NEGATIVE
HIV 1+2 AB+HIV1 P24 AG SERPL QL IA: NEGATIVE
N GONORRHOEA DNA SPEC QL NAA+PROBE: NOT DETECTED

## 2018-03-07 RX ORDER — METRONIDAZOLE 500 MG/1
500 TABLET ORAL EVERY 12 HOURS
Qty: 14 TABLET | Refills: 0 | Status: SHIPPED | OUTPATIENT
Start: 2018-03-07 | End: 2018-03-14

## 2018-06-01 ENCOUNTER — TELEPHONE (OUTPATIENT)
Dept: OBSTETRICS AND GYNECOLOGY | Facility: CLINIC | Age: 23
End: 2018-06-01

## 2018-06-04 ENCOUNTER — TELEPHONE (OUTPATIENT)
Dept: OBSTETRICS AND GYNECOLOGY | Facility: CLINIC | Age: 23
End: 2018-06-04

## 2018-06-05 ENCOUNTER — OFFICE VISIT (OUTPATIENT)
Dept: OBSTETRICS AND GYNECOLOGY | Facility: CLINIC | Age: 23
End: 2018-06-05
Payer: MEDICAID

## 2018-06-05 VITALS
DIASTOLIC BLOOD PRESSURE: 70 MMHG | HEIGHT: 62 IN | WEIGHT: 168.19 LBS | SYSTOLIC BLOOD PRESSURE: 110 MMHG | BODY MASS INDEX: 30.95 KG/M2

## 2018-06-05 DIAGNOSIS — Z30.8 ENCOUNTER FOR OTHER CONTRACEPTIVE MANAGEMENT: Primary | ICD-10-CM

## 2018-06-05 LAB
B-HCG UR QL: NEGATIVE
CTP QC/QA: YES

## 2018-06-05 PROCEDURE — 99213 OFFICE O/P EST LOW 20 MIN: CPT | Mod: S$PBB,,, | Performed by: OBSTETRICS & GYNECOLOGY

## 2018-06-05 PROCEDURE — 81025 URINE PREGNANCY TEST: CPT | Mod: PBBFAC | Performed by: OBSTETRICS & GYNECOLOGY

## 2018-06-05 PROCEDURE — 99999 PR PBB SHADOW E&M-EST. PATIENT-LVL II: CPT | Mod: PBBFAC,,, | Performed by: OBSTETRICS & GYNECOLOGY

## 2018-06-05 PROCEDURE — 99212 OFFICE O/P EST SF 10 MIN: CPT | Mod: PBBFAC | Performed by: OBSTETRICS & GYNECOLOGY

## 2018-06-05 NOTE — PROGRESS NOTES
Gynecology    SUBJECTIVE:     Chief Complaint: follow up AB       History of Present Illness:  22 year old who presents 3 months after having a medical  at 6 weeks.  Patient is not having issues other than heavy, monthly cycles.  Last cycle lasted one week.  Has had no fever, chills, nausea, vomiting, diarrhea/constipation.  No problems with bleeding or pelvic pain.  Patient is interested in different contraception as she is not good at remembering the pill.      Review of Systems:  Review of Systems   Constitutional: Negative for appetite change, fever and unexpected weight change.   Respiratory: Negative for shortness of breath.    Cardiovascular: Negative for chest pain.   Gastrointestinal: Negative for nausea and vomiting.   Genitourinary: Negative for menorrhagia, menstrual problem, pelvic pain, vaginal bleeding, vaginal discharge and vaginal pain.        OBJECTIVE:     Physical Exam:  Physical Exam   Constitutional: She is oriented to person, place, and time. She appears well-developed and well-nourished.   Pulmonary/Chest: Effort normal.   Neurological: She is oriented to person, place, and time.   Skin: No pallor.   Psychiatric: She has a normal mood and affect. Her behavior is normal. Judgment and thought content normal.   Nursing note and vitals reviewed.        ASSESSMENT:       ICD-10-CM ICD-9-CM    1. Encounter for other contraceptive management Z30.8 V25.8           Plan:      Dannielle was seen today for follow up ab.    Diagnoses and all orders for this visit:    Encounter for other contraceptive management    - Discussion with patient about options for contraception.  Reviewed pills/patches/ring, depo provera, nexplanon, and IUDs.  Risks/benefits of each discussed with patient. Patient desires mirena IUD for contraception.    - Paperwork signed today; advised to return within 7 days of first day of menstrual cycle if not using contraception, or to consistently use contraception prior to  placement of IUD.  Patient voiced understanding.       No orders of the defined types were placed in this encounter.      Follow-up for contraception.    Jessica Carrillo

## 2019-07-02 NOTE — TELEPHONE ENCOUNTER
Please advised that lab results show cholesterol levels are too high. Please ask him to start taking atorvastatin 40 mg daily. A prescription will be sent to his pharmacy. Please ask him to come by the office for fasting lab only after taking the medication for 3 months. He does not need an appointment for that. He should follow-up with an appointment in 6 months. Spoke to patient and she wanted to know how many more Sunni injections will she receive, she will get an injection today and was informed 1 more next week. Patient verbalized understanding.

## 2019-08-14 ENCOUNTER — OFFICE VISIT (OUTPATIENT)
Dept: OBSTETRICS AND GYNECOLOGY | Facility: CLINIC | Age: 24
End: 2019-08-14
Payer: MEDICAID

## 2019-08-14 VITALS
WEIGHT: 143.5 LBS | BODY MASS INDEX: 26.41 KG/M2 | HEIGHT: 62 IN | SYSTOLIC BLOOD PRESSURE: 126 MMHG | DIASTOLIC BLOOD PRESSURE: 84 MMHG

## 2019-08-14 DIAGNOSIS — N91.2 AMENORRHEA: Primary | ICD-10-CM

## 2019-08-14 LAB
B-HCG UR QL: POSITIVE
CTP QC/QA: YES

## 2019-08-14 PROCEDURE — 99214 PR OFFICE/OUTPT VISIT, EST, LEVL IV, 30-39 MIN: ICD-10-PCS | Mod: S$PBB,,, | Performed by: OBSTETRICS & GYNECOLOGY

## 2019-08-14 PROCEDURE — 99999 PR PBB SHADOW E&M-EST. PATIENT-LVL III: ICD-10-PCS | Mod: PBBFAC,,, | Performed by: OBSTETRICS & GYNECOLOGY

## 2019-08-14 PROCEDURE — 99999 PR PBB SHADOW E&M-EST. PATIENT-LVL III: CPT | Mod: PBBFAC,,, | Performed by: OBSTETRICS & GYNECOLOGY

## 2019-08-14 PROCEDURE — 87086 URINE CULTURE/COLONY COUNT: CPT

## 2019-08-14 PROCEDURE — 87491 CHLMYD TRACH DNA AMP PROBE: CPT

## 2019-08-14 PROCEDURE — 81025 URINE PREGNANCY TEST: CPT | Mod: PBBFAC,PN | Performed by: OBSTETRICS & GYNECOLOGY

## 2019-08-14 PROCEDURE — 99214 OFFICE O/P EST MOD 30 MIN: CPT | Mod: S$PBB,,, | Performed by: OBSTETRICS & GYNECOLOGY

## 2019-08-14 PROCEDURE — 99213 OFFICE O/P EST LOW 20 MIN: CPT | Mod: PBBFAC,PN | Performed by: OBSTETRICS & GYNECOLOGY

## 2019-08-14 NOTE — PROGRESS NOTES
"  CC: Positive Pregnancy Test    HISTORY OF PRESENT ILLNESS:    Dannielle De Jesus is a 23 y.o. female, ,  Presents today for a routine exam complaining of amenorrhea and positive home urine pregnancy test.  Patient's last menstrual period was 2019 (exact date).   She is not currently on any contraception.  Reports nausea. Reports breast tenderness.     LMP: 2019  Had heavy bleeding x 1 day approximately two weeks ago. Still with pelvic pain and cramping    GYN Hx:  + chlamydia    OB Hx:  1.   at 36 weeks  2.  at 37 weeks (on robin)  3. Elective AB    ROS:  GENERAL: No weight changes. No swelling. No fatigue. No fever.  CARDIOVASCULAR: No chest pain. No shortness of breath. No leg cramps.   NEUROLOGICAL: No headaches. No vision changes.  BREASTS: No pain. No lumps. No discharge.  ABDOMEN: No pain. No diarrhea. No constipation.  REPRODUCTIVE: No abnormal bleeding.   VULVA: No pain. No lesions. No itching.  VAGINA: No relaxation. No itching. No odor. No discharge. No lesions.  URINARY: No incontinence. No nocturia. No frequency. No dysuria.    MEDICATIONS AND ALLERGIES:  Reviewed        COMPREHENSIVE GYN HISTORY:  PAP History: Denies abnormal Paps.  Infection History: Denies STDs. Denies PID.  Benign History: Denies uterine fibroids. Denies ovarian cysts. Denies endometriosis. Denies other conditions.  Cancer History: Denies cervical cancer. Denies uterine cancer or hyperplasia. Denies ovarian cancer. Denies vulvar cancer or pre-cancer. Denies vaginal cancer or pre-cancer. Denies breast cancer. Denies colon cancer.  Sexual Activity History: Reports currently being sexually active  Menstrual History: None.  Contraception: None    /84 (BP Location: Right arm, Patient Position: Sitting, BP Method: Small (Manual))   Ht 5' 2" (1.575 m)   Wt 65.1 kg (143 lb 8.3 oz)   LMP 2019 (Exact Date)   BMI 26.25 kg/m²     PE:  AFFECT: Calm, alert and oriented X 3. Interactive during " exam  GENERAL: Appears well-nourished, well-developed, in no acute distress.  HEAD: Normocephalic, atruamatic  TEETH: Good dentition.  THYROID: No thyromegally   BREASTS: No masses, skin changes, nipple discharge or adenopathy bilaterally.  SKIN: Normal for race, warm, & dry. No lesions or rashes.  LUNGS: Easy and unlabored, clear to auscultation bilaterally.  HEART: Regular rate and rhythm   ABDOMEN: Soft and nontender without masses or organomegally.  VULVA: No lesions, masses or tenderness.  VAGINA: Moist and well rugated without lesions or discharge.  CERVIX: Moist and pink without lesions, discharge or tenderness.      UTERUS SIZE: 8 week size, nontender and without masses.  ADNEXA: No masses or tenderness.  ESTIMATE OF PELVIC CAPACITY: Adequate  EXTREMITIES: No cyanosis, clubbing or edema. No calf tenderness.  LYMPH NODES: No axillary or inguinal adenopathy.    PROCEDURES:  UPT Positive  Genprobe  Pap      ASSESSMENT/PLAN:  Amenorrhea  Positive urine pregnancy test (JEANE: 2019, EGA: 5 weeks based on LMP)    -  Routine prenatal care    Nausea and vomiting in pregnancy    -  Education regarding lifestyle and dietary modifications    -  Advised use of B6/Unisom. Pt will notify us if no relief/worsening symptoms, will consider Zofran if needed.      1st TRIMESTER COUNSELING: Discussed all, booklet provided:  Common complaints of pregnancy  HIV and other routine prenatal tests including  genetic screening  Risk factors identified by prenatal history  Oriented to practice - discussed anticipated course of prenatal care & indications for Ultrasound  Childbirth classes/Hospital facilities   Nutrition and weight gain counseling  Toxoplasmosis precautions (Cats/Raw Meat)  Sexual activity and exercise  Environmental/Work hazards  Travel  Tobacco (Ask, Advise, Assess, Assist, and Arrange), as well as alcohol and drug use  Use of any medications (Including supplements, Vitamins, Herbs, or OTC Drugs)  Domestic  violence  Seat belt use      TERATOLOGY COUNSELING:   Discussed indications and options for aneuploidy screening - pamphlets given    -  Pt desires and orders placed    -  Pt declines testing  Dating US and beta ordered today as patient has had severe bleeding and cramping    Hx of  delivery. On Sunni last pregnancy    FOLLOW-UP in 4 weeks with Dr. Mayito Edmond MD    OB/GYN

## 2019-08-15 ENCOUNTER — TELEPHONE (OUTPATIENT)
Dept: OBSTETRICS AND GYNECOLOGY | Facility: CLINIC | Age: 24
End: 2019-08-15

## 2019-08-15 DIAGNOSIS — O20.9 VAGINAL BLEEDING IN PREGNANCY, FIRST TRIMESTER: Primary | ICD-10-CM

## 2019-08-15 LAB
BACTERIA UR CULT: NORMAL
C TRACH DNA SPEC QL NAA+PROBE: NOT DETECTED
N GONORRHOEA DNA SPEC QL NAA+PROBE: NOT DETECTED

## 2019-08-15 NOTE — TELEPHONE ENCOUNTER
----- Message from Steven Patrick sent at 8/15/2019  8:55 AM CDT -----  Contact: Self  PT would like to speak with [facility]'s medical staff. Please call back [phone].

## 2019-08-15 NOTE — TELEPHONE ENCOUNTER
Spoke with pt. Pt states she is calling because she went to the ER yesterday after her visit. They stated right ovary is covered in cysts and that there was a sac but they could not find any fetal pole or cardiac activity. LMP 7/7/19. Pt would like to know what she should do. Please advise.

## 2019-08-19 ENCOUNTER — HOSPITAL ENCOUNTER (EMERGENCY)
Facility: OTHER | Age: 24
Discharge: HOME | End: 2019-08-19
Attending: EMERGENCY MEDICINE
Payer: MEDICAID

## 2019-08-19 VITALS
SYSTOLIC BLOOD PRESSURE: 117 MMHG | OXYGEN SATURATION: 100 % | BODY MASS INDEX: 26.31 KG/M2 | RESPIRATION RATE: 18 BRPM | HEART RATE: 88 BPM | HEIGHT: 62 IN | TEMPERATURE: 98 F | WEIGHT: 143 LBS | DIASTOLIC BLOOD PRESSURE: 56 MMHG

## 2019-08-19 DIAGNOSIS — N83.201 CYST OF RIGHT OVARY: ICD-10-CM

## 2019-08-19 DIAGNOSIS — R10.2 PELVIC PAIN AFFECTING PREGNANCY: Primary | ICD-10-CM

## 2019-08-19 DIAGNOSIS — O26.899 PELVIC PAIN AFFECTING PREGNANCY: Primary | ICD-10-CM

## 2019-08-19 LAB
ABO + RH BLD: NORMAL
B-HCG UR QL: POSITIVE
BACTERIA GENITAL QL WET PREP: NORMAL
BILIRUB UR QL STRIP: NEGATIVE
BLD GP AB SCN CELLS X3 SERPL QL: NORMAL
CLARITY UR: CLEAR
CLUE CELLS VAG QL WET PREP: NORMAL
COLOR UR: YELLOW
CTP QC/QA: YES
FILAMENT FUNGI VAG WET PREP-#/AREA: NORMAL
GLUCOSE UR QL STRIP: NEGATIVE
HCG INTACT+B SERPL-ACNC: NORMAL MIU/ML
HGB UR QL STRIP: NEGATIVE
KETONES UR QL STRIP: NEGATIVE
LEUKOCYTE ESTERASE UR QL STRIP: NEGATIVE
NITRITE UR QL STRIP: NEGATIVE
PH UR STRIP: 7 [PH] (ref 5–8)
PROT UR QL STRIP: NEGATIVE
SP GR UR STRIP: 1.01 (ref 1–1.03)
SPECIMEN SOURCE: NORMAL
T VAGINALIS GENITAL QL WET PREP: NORMAL
URN SPEC COLLECT METH UR: NORMAL
UROBILINOGEN UR STRIP-ACNC: NEGATIVE EU/DL
WBC #/AREA VAG WET PREP: NORMAL
YEAST GENITAL QL WET PREP: NORMAL

## 2019-08-19 PROCEDURE — 81003 URINALYSIS AUTO W/O SCOPE: CPT

## 2019-08-19 PROCEDURE — 99284 EMERGENCY DEPT VISIT MOD MDM: CPT | Mod: 25

## 2019-08-19 PROCEDURE — 87491 CHLMYD TRACH DNA AMP PROBE: CPT

## 2019-08-19 PROCEDURE — 87210 SMEAR WET MOUNT SALINE/INK: CPT

## 2019-08-19 PROCEDURE — 86901 BLOOD TYPING SEROLOGIC RH(D): CPT

## 2019-08-19 PROCEDURE — 84702 CHORIONIC GONADOTROPIN TEST: CPT

## 2019-08-19 PROCEDURE — 25000003 PHARM REV CODE 250: Performed by: EMERGENCY MEDICINE

## 2019-08-19 PROCEDURE — 81025 URINE PREGNANCY TEST: CPT | Performed by: EMERGENCY MEDICINE

## 2019-08-19 RX ORDER — HYDROCODONE BITARTRATE AND ACETAMINOPHEN 5; 325 MG/1; MG/1
1 TABLET ORAL
Status: COMPLETED | OUTPATIENT
Start: 2019-08-19 | End: 2019-08-19

## 2019-08-19 RX ORDER — HYDROCODONE BITARTRATE AND ACETAMINOPHEN 5; 325 MG/1; MG/1
1 TABLET ORAL EVERY 4 HOURS PRN
Qty: 12 TABLET | Refills: 0 | Status: SHIPPED | OUTPATIENT
Start: 2019-08-19

## 2019-08-19 RX ORDER — ONDANSETRON 4 MG/1
4 TABLET, ORALLY DISINTEGRATING ORAL EVERY 8 HOURS PRN
Qty: 12 TABLET | Refills: 0 | OUTPATIENT
Start: 2019-08-19 | End: 2020-01-15

## 2019-08-19 RX ADMIN — HYDROCODONE BITARTRATE AND ACETAMINOPHEN 1 TABLET: 5; 325 TABLET ORAL at 02:08

## 2019-08-19 NOTE — ED PROVIDER NOTES
Encounter Date: 2019    SCRIBE #1 NOTE: I, Feroz Lieberman, am scribing for, and in the presence of, Dr. Spencer.       History     Chief Complaint   Patient presents with    Abdominal Pain     Pt sent by OB for evaluation. Pt states she had an US on friday and was told that they could see a sac but no embryo. Pt denies vag bleeding    Back Pain     Time seen by provider: 1:52 PM    This is a 23 y.o. pregnant female A1 in first trimester who was sent to ED by OBGYN (Dr. Terence Edmond) secondary to abnormal US results 3 days ago. US only showed a gestational sac with no clear IUP. She also c/o right sided lower abdominal pain that began 3 weeks ago and one episode of heavy vaginal bleeding two weeks ago. Her LNMP was .  She has a hx of an elective  and no hx of ovarian cyst.  Denies urinary symptoms.  Denies vaginal discharge. Denies fever/chills.  Denies nausea/vomiting/diarrhea.    The history is provided by the patient.     Review of patient's allergies indicates:  No Known Allergies  No past medical history on file.  No past surgical history on file.  Family History   Problem Relation Age of Onset    Cancer Neg Hx      labor Neg Hx     Diabetes Neg Hx     Breast cancer Neg Hx     Colon cancer Neg Hx     Ovarian cancer Neg Hx      Social History     Tobacco Use    Smoking status: Never Smoker    Smokeless tobacco: Never Used   Substance Use Topics    Alcohol use: Not Currently     Comment: social use    Drug use: No     Review of Systems   Constitutional: Negative for chills and fever.   HENT: Negative for congestion, rhinorrhea and sore throat.    Eyes: Negative for visual disturbance.   Respiratory: Negative for cough and shortness of breath.    Cardiovascular: Negative for chest pain.   Gastrointestinal: Positive for abdominal pain. Negative for diarrhea, nausea and vomiting.   Genitourinary: Positive for pelvic pain and vaginal bleeding (now resolved). Negative for  dysuria.   Musculoskeletal: Negative for back pain.   Skin: Negative for rash.   Neurological: Negative for dizziness, weakness and light-headedness.   Psychiatric/Behavioral: Negative for confusion.       Physical Exam     Initial Vitals [08/19/19 1243]   BP Pulse Resp Temp SpO2   115/66 89 18 98.9 °F (37.2 °C) 99 %      MAP       --         Physical Exam    Nursing note and vitals reviewed.  Constitutional: She appears well-developed and well-nourished.   HENT:   Head: Normocephalic and atraumatic.   Eyes: Conjunctivae are normal.   Neck: Normal range of motion. Neck supple.   Cardiovascular: Normal rate, regular rhythm and normal heart sounds.   Pulmonary/Chest: Breath sounds normal. No respiratory distress. She has no wheezes. She has no rales.   Abdominal: Soft. Bowel sounds are normal. She exhibits no distension. There is no tenderness. There is no rebound.   Genitourinary:   Genitourinary Comments: No blood in the vault.  White discharge noted.  Os closed.  No CMT or adnexal tenderness bilaterally.  RN present at bedside.   Musculoskeletal: Normal range of motion.   Neurological: She is alert and oriented to person, place, and time.   Ambulatory with steady gait   Skin: Skin is warm and dry. Capillary refill takes less than 2 seconds.         ED Course   Procedures  Labs Reviewed   POCT URINE PREGNANCY - Abnormal; Notable for the following components:       Result Value    POC Preg Test, Ur Positive (*)     All other components within normal limits   URINALYSIS, REFLEX TO URINE CULTURE          Imaging Results    None              Medical Decision Making:   History:   Old Medical Records: I decided to obtain old medical records.  Old Records Summarized: records from another hospital and other records.  Initial Assessment:   1:52PM:  Patient is a 23-year-old female who presents to the emergency department with lower abdominal pain, right-sided pelvic pain while pregnant.  Patient appears well, nontoxic.  Her  most recent ultrasound only showed a gestational sac with no clear IUP, though she is still very early by her dates.  Will plan for repeat HCG level here, along with another ultrasound, will continue to follow and reassess.  Clinical Tests:   Lab Tests: Ordered and Reviewed  Radiological Study: Ordered and Reviewed    4:48 PM: Pt doing well, remains stable. Her ultrasound does show a clear IUP along with right-sided ovarian cyst, likely the etiology of her pain. She has no blood in her pelvic exam.  She does request pain meds for her pelvic pain. She does understand the risks of taking the pain medication while pregnant.  She has been advised to take as little as possible.  She already has an appointment with her OB gyn for follow-up established for next week.          Scribe Attestation:   Scribe #1: I performed the above scribed service and the documentation accurately describes the services I performed. I attest to the accuracy of the note.    Attending Attestation:           Physician Attestation for Scribe:  Physician Attestation Statement for Scribe #1: I, Dr. Spencer , reviewed documentation, as scribed by Feroz Lieberman in my presence, and it is both accurate and complete.                    Clinical Impression:     1. Pelvic pain affecting pregnancy    2. Cyst of right ovary                                 Gina Spencer MD  08/19/19 5747

## 2019-08-19 NOTE — ED NOTES
LMP 7/7/19, c/o R upper + lower abd, R pelvic pain x several weeks, reporting pain radiates from R mid abd to R lateral mid back. Denies fever, chills, vaginal bleeding, vaginal discharge, dysuria. Pt AAOx4 and appropriate at this time. Respirations even and unlabored. No acute distress noted.

## 2019-08-19 NOTE — ED NOTES
ROUNDING:  Lying on the stretcher with HOB at semi-fowlers.  AAOx4. States lower back pain 6/10. Denies abdominal pain, sob, cp, dizziness, lightheadedness, n/v or any other discomfort at this time. Skin is warm and dry. Resp:18 even and unlabored. Comfort and BR needs addressed. Plan of care updated. NADN. Family member at BS. Bed locked in low position, side rails up x2 and call light within reach. Will continue to monitor.

## 2019-08-19 NOTE — DISCHARGE INSTRUCTIONS
We have prescribed you pain medication. Please fill and take as directed. Do not drink alcohol or drive while taking this medication.  Do not take any additional tylenol while taking this medication.  It is important to take stool softeners and/or eat plenty of fiber to prevent constipation while taking this medication.      Please return to the ER if you have chest pain, difficulty breathing, fevers, altered mental status, dizziness, weakness, or any other concerns.      Follow up with your OB physician.

## 2019-08-20 LAB
C TRACH DNA SPEC QL NAA+PROBE: NOT DETECTED
N GONORRHOEA DNA SPEC QL NAA+PROBE: NOT DETECTED

## 2019-09-10 ENCOUNTER — TELEPHONE (OUTPATIENT)
Dept: MATERNAL FETAL MEDICINE | Facility: CLINIC | Age: 24
End: 2019-09-10

## 2019-09-10 NOTE — TELEPHONE ENCOUNTER
This is the first attempt to reach Dannielle about scheduling a NT scan. A message was left for the patient to call the office back.

## 2019-10-09 ENCOUNTER — TELEPHONE (OUTPATIENT)
Dept: OBSTETRICS AND GYNECOLOGY | Facility: CLINIC | Age: 24
End: 2019-10-09

## 2019-10-09 NOTE — TELEPHONE ENCOUNTER
----- Message from Melanie Santoyo RN sent at 10/9/2019  8:24 AM CDT -----  Regarding: pt was a NO SHOW  FYI, this pt was a NO SHOW for the NT scan yesterday.    Thanks,    Melanie Santoyo RN

## 2019-11-19 ENCOUNTER — ROUTINE PRENATAL (OUTPATIENT)
Dept: OBSTETRICS AND GYNECOLOGY | Facility: CLINIC | Age: 24
End: 2019-11-19
Payer: MEDICAID

## 2019-11-19 ENCOUNTER — TELEPHONE (OUTPATIENT)
Dept: MATERNAL FETAL MEDICINE | Facility: CLINIC | Age: 24
End: 2019-11-19

## 2019-11-19 VITALS
DIASTOLIC BLOOD PRESSURE: 70 MMHG | BODY MASS INDEX: 24.92 KG/M2 | WEIGHT: 136.25 LBS | SYSTOLIC BLOOD PRESSURE: 118 MMHG

## 2019-11-19 DIAGNOSIS — R39.15 URINARY URGENCY: ICD-10-CM

## 2019-11-19 DIAGNOSIS — O09.92 SUPERVISION OF HIGH RISK PREGNANCY IN SECOND TRIMESTER: ICD-10-CM

## 2019-11-19 DIAGNOSIS — O09.899 HISTORY OF PRETERM DELIVERY, CURRENTLY PREGNANT: Primary | ICD-10-CM

## 2019-11-19 LAB
BACTERIA #/AREA URNS AUTO: ABNORMAL /HPF
BILIRUB UR QL STRIP: NEGATIVE
CAOX CRY UR QL COMP ASSIST: ABNORMAL
CLARITY UR REFRACT.AUTO: ABNORMAL
COLOR UR AUTO: ABNORMAL
GLUCOSE UR QL STRIP: NEGATIVE
HGB UR QL STRIP: NEGATIVE
HYALINE CASTS UR QL AUTO: 0 /LPF
KETONES UR QL STRIP: ABNORMAL
LEUKOCYTE ESTERASE UR QL STRIP: ABNORMAL
MICROSCOPIC COMMENT: ABNORMAL
NITRITE UR QL STRIP: NEGATIVE
PH UR STRIP: 6 [PH] (ref 5–8)
PROT UR QL STRIP: ABNORMAL
RBC #/AREA URNS AUTO: 1 /HPF (ref 0–4)
SP GR UR STRIP: 1.03 (ref 1–1.03)
SQUAMOUS #/AREA URNS AUTO: 25 /HPF
URN SPEC COLLECT METH UR: ABNORMAL
WBC #/AREA URNS AUTO: 9 /HPF (ref 0–5)
YEAST UR QL AUTO: ABNORMAL

## 2019-11-19 PROCEDURE — 99213 OFFICE O/P EST LOW 20 MIN: CPT | Mod: TH,S$PBB,, | Performed by: OBSTETRICS & GYNECOLOGY

## 2019-11-19 PROCEDURE — 87086 URINE CULTURE/COLONY COUNT: CPT

## 2019-11-19 PROCEDURE — 99999 PR PBB SHADOW E&M-EST. PATIENT-LVL II: ICD-10-PCS | Mod: PBBFAC,,, | Performed by: OBSTETRICS & GYNECOLOGY

## 2019-11-19 PROCEDURE — 99213 PR OFFICE/OUTPT VISIT, EST, LEVL III, 20-29 MIN: ICD-10-PCS | Mod: TH,S$PBB,, | Performed by: OBSTETRICS & GYNECOLOGY

## 2019-11-19 PROCEDURE — 99999 PR PBB SHADOW E&M-EST. PATIENT-LVL II: CPT | Mod: PBBFAC,,, | Performed by: OBSTETRICS & GYNECOLOGY

## 2019-11-19 PROCEDURE — 99212 OFFICE O/P EST SF 10 MIN: CPT | Mod: PBBFAC,TH,PN | Performed by: OBSTETRICS & GYNECOLOGY

## 2019-11-19 PROCEDURE — 81001 URINALYSIS AUTO W/SCOPE: CPT

## 2019-11-19 NOTE — PROGRESS NOTES
Patient is doing well.  Denies CTX, VB, LOF.  Reports intermittent back pain.    Also of note her PCP told her she may have a UTI, will send urine today.  Anatomy U/S ordered.

## 2019-11-19 NOTE — TELEPHONE ENCOUNTER
Called pt to scheduled anatomy scan. Offered appt tomorrow 11/20 or 11/21. Pt declined reporting she has to work. Pt requested 12/4. Scheduled at 1:40 pm. Discussed date, time and location of appt. Pt verbalized understanding of information.

## 2019-11-20 LAB — BACTERIA UR CULT: NO GROWTH

## 2019-11-20 RX ORDER — HYDROXYPROGESTERONE CAPROATE 250 MG/ML
250 INJECTION INTRAMUSCULAR
Qty: 18 ML | Refills: 0 | Status: SHIPPED | OUTPATIENT
Start: 2019-11-20 | End: 2020-04-03

## 2019-11-21 ENCOUNTER — TELEPHONE (OUTPATIENT)
Dept: PHARMACY | Facility: CLINIC | Age: 24
End: 2019-11-21

## 2019-11-21 NOTE — TELEPHONE ENCOUNTER
LVM to notify the patient we received the prescription for [place], and to see if she has any active prescription insurance. We will continue to follow up.

## 2019-11-22 ENCOUNTER — TELEPHONE (OUTPATIENT)
Dept: OBSTETRICS AND GYNECOLOGY | Facility: CLINIC | Age: 24
End: 2019-11-22

## 2019-11-22 DIAGNOSIS — N30.00 ACUTE CYSTITIS WITHOUT HEMATURIA: Primary | ICD-10-CM

## 2019-11-22 RX ORDER — NITROFURANTOIN 25; 75 MG/1; MG/1
100 CAPSULE ORAL 2 TIMES DAILY
Qty: 14 CAPSULE | Refills: 0 | Status: SHIPPED | OUTPATIENT
Start: 2019-11-22 | End: 2019-11-29

## 2019-11-22 NOTE — TELEPHONE ENCOUNTER
----- Message from JAIME Velez MD sent at 11/22/2019  1:38 PM CST -----  Urine culture negative, however she as told by a primary care doctor she may have a UTI and her urinalysis was suggestive of possible UTI. I have sent a prescription to her pharmacy please inform her of these results.

## 2019-11-25 ENCOUNTER — PATIENT MESSAGE (OUTPATIENT)
Dept: PHARMACY | Facility: CLINIC | Age: 24
End: 2019-11-25

## 2019-11-25 ENCOUNTER — TELEPHONE (OUTPATIENT)
Dept: OBSTETRICS AND GYNECOLOGY | Facility: CLINIC | Age: 24
End: 2019-11-25

## 2019-11-25 NOTE — TELEPHONE ENCOUNTER
LVM to notify the patient we received the prescription for [place], and to see if she has any active prescription insurance. We will continue to follow up.

## 2019-11-26 ENCOUNTER — TELEPHONE (OUTPATIENT)
Dept: OBSTETRICS AND GYNECOLOGY | Facility: CLINIC | Age: 24
End: 2019-11-26

## 2019-11-26 ENCOUNTER — PATIENT MESSAGE (OUTPATIENT)
Dept: OBSTETRICS AND GYNECOLOGY | Facility: CLINIC | Age: 24
End: 2019-11-26

## 2019-11-27 ENCOUNTER — TELEPHONE (OUTPATIENT)
Dept: PHARMACY | Facility: CLINIC | Age: 24
End: 2019-11-27

## 2019-11-27 ENCOUNTER — TELEPHONE (OUTPATIENT)
Dept: OBSTETRICS AND GYNECOLOGY | Facility: CLINIC | Age: 24
End: 2019-11-27

## 2019-11-27 NOTE — TELEPHONE ENCOUNTER
----- Message from Margaret Meek sent at 11/27/2019  2:25 PM CST -----  Contact: ALEXIS COFFMAN [[id number]]  Name of Who is Calling : ALEXIS COFFMAN [[id number]]    Patient is requesting a call from staff in regards to results from last visit   .....Please contact to further discuss and advise.    Can the clinic reply by [facility] : ALEXIS COFFMAN [[id number]]    What Number to Call Back :  [phone]

## 2019-11-27 NOTE — TELEPHONE ENCOUNTER
LVM to notify the patient we received the prescription for [place], and to see if she has any active prescription insurance. We will continue to follow up.

## 2019-11-29 ENCOUNTER — TELEPHONE (OUTPATIENT)
Dept: PHARMACY | Facility: CLINIC | Age: 24
End: 2019-11-29

## 2019-11-29 NOTE — TELEPHONE ENCOUNTER
LVM to see if patient has prescription insurance. I will also message the provider and mail out a postcard. Will push the patient out 2 weeks. SAROJ

## 2019-12-04 ENCOUNTER — TELEPHONE (OUTPATIENT)
Dept: PHARMACY | Facility: CLINIC | Age: 24
End: 2019-12-04

## 2019-12-04 ENCOUNTER — PROCEDURE VISIT (OUTPATIENT)
Dept: MATERNAL FETAL MEDICINE | Facility: CLINIC | Age: 24
End: 2019-12-04
Attending: OBSTETRICS & GYNECOLOGY
Payer: COMMERCIAL

## 2019-12-04 DIAGNOSIS — Z36.89 ENCOUNTER FOR FETAL ANATOMIC SURVEY: ICD-10-CM

## 2019-12-04 DIAGNOSIS — O09.92 SUPERVISION OF HIGH RISK PREGNANCY IN SECOND TRIMESTER: ICD-10-CM

## 2019-12-04 PROCEDURE — 76805 OB US >/= 14 WKS SNGL FETUS: CPT | Mod: S$GLB,,, | Performed by: OBSTETRICS & GYNECOLOGY

## 2019-12-04 PROCEDURE — 76805 PR US, OB 14+WKS, TRANSABD, SINGLE GESTATION: ICD-10-PCS | Mod: S$GLB,,, | Performed by: OBSTETRICS & GYNECOLOGY

## 2019-12-04 NOTE — TELEPHONE ENCOUNTER
LVM to see if patient [facility]. We will continue to follow up.

## 2019-12-09 ENCOUNTER — TELEPHONE (OUTPATIENT)
Dept: PHARMACY | Facility: CLINIC | Age: 24
End: 2019-12-09

## 2019-12-09 ENCOUNTER — TELEPHONE (OUTPATIENT)
Dept: OBSTETRICS AND GYNECOLOGY | Facility: CLINIC | Age: 24
End: 2019-12-09

## 2019-12-09 NOTE — TELEPHONE ENCOUNTER
LVM to see if patient has prescription insurance figured out. I will also message the provider and mail out a postcard. Will push the patient out 2 weeks.

## 2019-12-09 NOTE — TELEPHONE ENCOUNTER
----- Message from Julienne Mattson sent at 12/9/2019 11:52 AM CST -----  Regarding: Sunni  We have been unsuccessful at reaching the patient to obtain updated insurance information. We will mail her a postcard and if we do not hear back from her in 2 weeks we will opt her out of our services until further contact is made. Please let me know if there is anything further I can do to be of assistance.     Thank you,   [facility]

## 2019-12-20 ENCOUNTER — ROUTINE PRENATAL (OUTPATIENT)
Dept: OBSTETRICS AND GYNECOLOGY | Facility: CLINIC | Age: 24
End: 2019-12-20
Payer: COMMERCIAL

## 2019-12-20 ENCOUNTER — CLINICAL SUPPORT (OUTPATIENT)
Dept: OBSTETRICS AND GYNECOLOGY | Facility: CLINIC | Age: 24
End: 2019-12-20
Payer: COMMERCIAL

## 2019-12-20 VITALS
DIASTOLIC BLOOD PRESSURE: 66 MMHG | SYSTOLIC BLOOD PRESSURE: 116 MMHG | BODY MASS INDEX: 26.73 KG/M2 | WEIGHT: 146.19 LBS

## 2019-12-20 DIAGNOSIS — O26.892 PREGNANCY RELATED PELVIC PAIN, ANTEPARTUM, SECOND TRIMESTER: Primary | ICD-10-CM

## 2019-12-20 DIAGNOSIS — Z23 FLU VACCINE NEED: Primary | ICD-10-CM

## 2019-12-20 DIAGNOSIS — O09.92 SUPERVISION OF HIGH RISK PREGNANCY IN SECOND TRIMESTER: ICD-10-CM

## 2019-12-20 DIAGNOSIS — R10.2 PREGNANCY RELATED PELVIC PAIN, ANTEPARTUM, SECOND TRIMESTER: Primary | ICD-10-CM

## 2019-12-20 PROBLEM — J11.1 FLU: Status: RESOLVED | Noted: 2017-02-06 | Resolved: 2019-12-20

## 2019-12-20 PROBLEM — Z22.330 CARRIER OF GROUP B STREPTOCOCCUS: Status: RESOLVED | Noted: 2017-04-21 | Resolved: 2019-12-20

## 2019-12-20 PROBLEM — O47.00 PRETERM UTERINE CONTRACTIONS: Status: RESOLVED | Noted: 2017-01-30 | Resolved: 2019-12-20

## 2019-12-20 LAB
BACTERIA #/AREA URNS AUTO: ABNORMAL /HPF
BILIRUB UR QL STRIP: NEGATIVE
CLARITY UR REFRACT.AUTO: ABNORMAL
COLOR UR AUTO: YELLOW
GLUCOSE UR QL STRIP: NEGATIVE
HGB UR QL STRIP: NEGATIVE
KETONES UR QL STRIP: NEGATIVE
LEUKOCYTE ESTERASE UR QL STRIP: NEGATIVE
MICROSCOPIC COMMENT: ABNORMAL
NITRITE UR QL STRIP: NEGATIVE
PH UR STRIP: 6 [PH] (ref 5–8)
PROT UR QL STRIP: NEGATIVE
RBC #/AREA URNS AUTO: 0 /HPF (ref 0–4)
SP GR UR STRIP: 1.02 (ref 1–1.03)
SQUAMOUS #/AREA URNS AUTO: 26 /HPF
URN SPEC COLLECT METH UR: ABNORMAL
WBC #/AREA URNS AUTO: 1 /HPF (ref 0–5)

## 2019-12-20 PROCEDURE — 90686 IIV4 VACC NO PRSV 0.5 ML IM: CPT | Mod: S$GLB,,, | Performed by: OBSTETRICS & GYNECOLOGY

## 2019-12-20 PROCEDURE — 0502F PR SUBSEQUENT PRENATAL CARE: ICD-10-PCS | Mod: S$GLB,,, | Performed by: OBSTETRICS & GYNECOLOGY

## 2019-12-20 PROCEDURE — 90686 FLU VACCINE (QUAD) GREATER THAN OR EQUAL TO 3YO PRESERVATIVE FREE IM: ICD-10-PCS | Mod: S$GLB,,, | Performed by: OBSTETRICS & GYNECOLOGY

## 2019-12-20 PROCEDURE — 3008F BODY MASS INDEX DOCD: CPT | Mod: CPTII,S$GLB,, | Performed by: OBSTETRICS & GYNECOLOGY

## 2019-12-20 PROCEDURE — 90471 FLU VACCINE (QUAD) GREATER THAN OR EQUAL TO 3YO PRESERVATIVE FREE IM: ICD-10-PCS | Mod: S$GLB,,, | Performed by: OBSTETRICS & GYNECOLOGY

## 2019-12-20 PROCEDURE — 99999 PR PBB SHADOW E&M-EST. PATIENT-LVL II: CPT | Mod: PBBFAC,,, | Performed by: OBSTETRICS & GYNECOLOGY

## 2019-12-20 PROCEDURE — 81001 URINALYSIS AUTO W/SCOPE: CPT

## 2019-12-20 PROCEDURE — 99999 PR PBB SHADOW E&M-EST. PATIENT-LVL II: ICD-10-PCS | Mod: PBBFAC,,, | Performed by: OBSTETRICS & GYNECOLOGY

## 2019-12-20 PROCEDURE — 99999 PR PBB SHADOW E&M-EST. PATIENT-LVL II: CPT | Mod: PBBFAC,,,

## 2019-12-20 PROCEDURE — 3008F PR BODY MASS INDEX (BMI) DOCUMENTED: ICD-10-PCS | Mod: CPTII,S$GLB,, | Performed by: OBSTETRICS & GYNECOLOGY

## 2019-12-20 PROCEDURE — 0502F SUBSEQUENT PRENATAL CARE: CPT | Mod: S$GLB,,, | Performed by: OBSTETRICS & GYNECOLOGY

## 2019-12-20 PROCEDURE — 90471 IMMUNIZATION ADMIN: CPT | Mod: S$GLB,,, | Performed by: OBSTETRICS & GYNECOLOGY

## 2019-12-20 PROCEDURE — 99999 PR PBB SHADOW E&M-EST. PATIENT-LVL II: ICD-10-PCS | Mod: PBBFAC,,,

## 2019-12-20 NOTE — PROGRESS NOTES
"Patient is doing okay, she complains of pelvic pressure and missed work, as well as "shooting pains" in sides.  Reports normal fetal movement.   Denies CTX, VB, LOF.    Flu today.  Missed robin dose last week due to insurance gap coverage.  Now has insurance.    Glucose ordered, antibody screen and CBC ordered.    Cervix not dilated one exam, udip negative in clinic, will send for urinalysis    "

## 2019-12-20 NOTE — PROGRESS NOTES
Pt here for Flu vaccine. No complaints of pain before or after injection. Injection given in the Right Deltoid at 9:34 am. Aspirated and no blood return noted. Pt advised to wait 15 minutes in lobby and report any adverse reactions. Pt verbalized understanding.     Lot [id number]  Exp 6/30/20     Private car

## 2019-12-31 ENCOUNTER — PATIENT MESSAGE (OUTPATIENT)
Dept: MATERNAL FETAL MEDICINE | Facility: CLINIC | Age: 24
End: 2019-12-31

## 2020-01-03 ENCOUNTER — PATIENT MESSAGE (OUTPATIENT)
Dept: OBSTETRICS AND GYNECOLOGY | Facility: CLINIC | Age: 25
End: 2020-01-03

## 2020-01-06 ENCOUNTER — TELEPHONE (OUTPATIENT)
Dept: OBSTETRICS AND GYNECOLOGY | Facility: CLINIC | Age: 25
End: 2020-01-06

## 2020-01-06 NOTE — TELEPHONE ENCOUNTER
Spoke with pt. Pt states she needs to make appointment for her Sunni. Advised pt that the pharmacy has not sent over her medication yet, they are waiting on her insurance information. Pt stated she will call them again to verify. No further questions or concerns noted.

## 2020-01-06 NOTE — TELEPHONE ENCOUNTER
----- Message from Megan Casanova sent at 1/6/2020 11:43 AM CST -----  Contact: [phone]/self   Patient is requesting to speak with nurse regarding scheduling an injection. Please call and advise.

## 2020-01-06 NOTE — TELEPHONE ENCOUNTER
----- Message from Edelmira Hwang sent at 1/6/2020  3:06 PM CST -----  Contact: [employer]  Type: Needs Medical Advice    Who Called:  Meredith-  Best Call Back Number: [phone]  Additional Information: The called is asking about if this patient is getting 17P injections and if so when did she start the injections please call to advise.

## 2020-01-08 NOTE — TELEPHONE ENCOUNTER
Attempted to inform patient of medication approval for [place] - $0 copay.  SLICK/ISABEL Paul msgd.

## 2020-01-09 ENCOUNTER — TELEPHONE (OUTPATIENT)
Dept: OBSTETRICS AND GYNECOLOGY | Facility: CLINIC | Age: 25
End: 2020-01-09

## 2020-01-09 NOTE — TELEPHONE ENCOUNTER
Spoke with pt. [place] is approved and will be delivered to clinic. appt scheduled for 1/15/20 to have injections started. Pt verbalized understanding.

## 2020-01-09 NOTE — TELEPHONE ENCOUNTER
Patient returned [facility] call in regards to [place] Rx. She confirms two patient identifiers - name + . She states that she has been on Sunni with her last pregnancy so declines further consultation. She also knows that she is outside of the recommended start window, but it's been discussed with the doctor and she needs to start ASAP. She is currently about 26 weeks GA. She grants [facility] consent to deliver [place] to [facility] for weekly administration. Her net appt is on 1/15/20. $0 copay (4 doses = 28d/s).    Msg sent to provider + staff to confirm delivery address. TTN

## 2020-01-15 ENCOUNTER — HOSPITAL ENCOUNTER (EMERGENCY)
Facility: OTHER | Age: 25
Discharge: HOME | End: 2020-01-15
Attending: OBSTETRICS & GYNECOLOGY
Payer: MEDICAID

## 2020-01-15 ENCOUNTER — CLINICAL SUPPORT (OUTPATIENT)
Dept: OBSTETRICS AND GYNECOLOGY | Facility: CLINIC | Age: 25
End: 2020-01-15
Payer: MEDICAID

## 2020-01-15 ENCOUNTER — PROCEDURE VISIT (OUTPATIENT)
Dept: MATERNAL FETAL MEDICINE | Facility: CLINIC | Age: 25
End: 2020-01-15
Payer: MEDICAID

## 2020-01-15 VITALS
TEMPERATURE: 98 F | HEART RATE: 80 BPM | DIASTOLIC BLOOD PRESSURE: 61 MMHG | RESPIRATION RATE: 16 BRPM | SYSTOLIC BLOOD PRESSURE: 104 MMHG | OXYGEN SATURATION: 100 %

## 2020-01-15 DIAGNOSIS — O26.892 PREGNANCY HEADACHE IN SECOND TRIMESTER: Primary | ICD-10-CM

## 2020-01-15 DIAGNOSIS — Z34.93 NORMAL PREGNANCY IN THIRD TRIMESTER: ICD-10-CM

## 2020-01-15 DIAGNOSIS — R51.9 HEADACHE IN PREGNANCY, THIRD TRIMESTER: Primary | ICD-10-CM

## 2020-01-15 DIAGNOSIS — Z87.51 HISTORY OF PRETERM DELIVERY: ICD-10-CM

## 2020-01-15 DIAGNOSIS — O09.899 HISTORY OF PRETERM DELIVERY, CURRENTLY PREGNANT: Primary | ICD-10-CM

## 2020-01-15 DIAGNOSIS — Z3A.27 27 WEEKS GESTATION OF PREGNANCY: ICD-10-CM

## 2020-01-15 DIAGNOSIS — R51.9 PREGNANCY HEADACHE IN SECOND TRIMESTER: Primary | ICD-10-CM

## 2020-01-15 DIAGNOSIS — O26.893 HEADACHE IN PREGNANCY, THIRD TRIMESTER: Primary | ICD-10-CM

## 2020-01-15 LAB
ALBUMIN SERPL BCP-MCNC: 2.6 G/DL (ref 3.5–5.2)
ALP SERPL-CCNC: 70 U/L (ref 55–135)
ALT SERPL W/O P-5'-P-CCNC: 8 U/L (ref 10–44)
ANION GAP SERPL CALC-SCNC: 8 MMOL/L (ref 8–16)
AST SERPL-CCNC: 9 U/L (ref 10–40)
BASOPHILS # BLD AUTO: 0.02 K/UL (ref 0–0.2)
BASOPHILS NFR BLD: 0.2 % (ref 0–1.9)
BILIRUB SERPL-MCNC: 0.1 MG/DL (ref 0.1–1)
BUN SERPL-MCNC: 6 MG/DL (ref 6–20)
CALCIUM SERPL-MCNC: 8.8 MG/DL (ref 8.7–10.5)
CHLORIDE SERPL-SCNC: 108 MMOL/L (ref 95–110)
CO2 SERPL-SCNC: 22 MMOL/L (ref 23–29)
CREAT SERPL-MCNC: 0.6 MG/DL (ref 0.5–1.4)
CREAT UR-MCNC: 189.4 MG/DL (ref 15–325)
DIFFERENTIAL METHOD: ABNORMAL
EOSINOPHIL # BLD AUTO: 0.1 K/UL (ref 0–0.5)
EOSINOPHIL NFR BLD: 0.7 % (ref 0–8)
ERYTHROCYTE [DISTWIDTH] IN BLOOD BY AUTOMATED COUNT: 13 % (ref 11.5–14.5)
EST. GFR  (AFRICAN AMERICAN): >60 ML/MIN/1.73 M^2
EST. GFR  (NON AFRICAN AMERICAN): >60 ML/MIN/1.73 M^2
GLUCOSE SERPL-MCNC: 91 MG/DL (ref 70–110)
HCT VFR BLD AUTO: 26.7 % (ref 37–48.5)
HGB BLD-MCNC: 8.8 G/DL (ref 12–16)
IMM GRANULOCYTES # BLD AUTO: 0.05 K/UL (ref 0–0.04)
IMM GRANULOCYTES NFR BLD AUTO: 0.5 % (ref 0–0.5)
LYMPHOCYTES # BLD AUTO: 1.2 K/UL (ref 1–4.8)
LYMPHOCYTES NFR BLD: 12.6 % (ref 18–48)
MCH RBC QN AUTO: 27.3 PG (ref 27–31)
MCHC RBC AUTO-ENTMCNC: 33 G/DL (ref 32–36)
MCV RBC AUTO: 83 FL (ref 82–98)
MONOCYTES # BLD AUTO: 0.8 K/UL (ref 0.3–1)
MONOCYTES NFR BLD: 7.9 % (ref 4–15)
NEUTROPHILS # BLD AUTO: 7.4 K/UL (ref 1.8–7.7)
NEUTROPHILS NFR BLD: 78.1 % (ref 38–73)
NRBC BLD-RTO: 0 /100 WBC
PLATELET # BLD AUTO: 356 K/UL (ref 150–350)
PMV BLD AUTO: 9.2 FL (ref 9.2–12.9)
POTASSIUM SERPL-SCNC: 3.6 MMOL/L (ref 3.5–5.1)
PROT SERPL-MCNC: 6.6 G/DL (ref 6–8.4)
PROT UR-MCNC: 23 MG/DL (ref 0–15)
PROT/CREAT UR: 0.12 MG/G{CREAT} (ref 0–0.2)
RBC # BLD AUTO: 3.22 M/UL (ref 4–5.4)
SODIUM SERPL-SCNC: 138 MMOL/L (ref 136–145)
WBC # BLD AUTO: 9.5 K/UL (ref 3.9–12.7)

## 2020-01-15 PROCEDURE — 99999 PR PBB SHADOW E&M-EST. PATIENT-LVL II: CPT | Mod: PBBFAC,,,

## 2020-01-15 PROCEDURE — 99284 EMERGENCY DEPT VISIT MOD MDM: CPT | Mod: 25,27

## 2020-01-15 PROCEDURE — 84156 ASSAY OF PROTEIN URINE: CPT

## 2020-01-15 PROCEDURE — 76816 OB US FOLLOW-UP PER FETUS: CPT | Mod: 26,S$PBB,, | Performed by: OBSTETRICS & GYNECOLOGY

## 2020-01-15 PROCEDURE — 96372 THER/PROPH/DIAG INJ SC/IM: CPT | Mod: PBBFAC,PN

## 2020-01-15 PROCEDURE — 85025 COMPLETE CBC W/AUTO DIFF WBC: CPT

## 2020-01-15 PROCEDURE — 76816 PR  US,PREGNANT UTERUS,F/U,TRANSABD APP: ICD-10-PCS | Mod: 26,S$PBB,, | Performed by: OBSTETRICS & GYNECOLOGY

## 2020-01-15 PROCEDURE — 99284 EMERGENCY DEPT VISIT MOD MDM: CPT | Mod: 25,,, | Performed by: OBSTETRICS & GYNECOLOGY

## 2020-01-15 PROCEDURE — 80053 COMPREHEN METABOLIC PANEL: CPT

## 2020-01-15 PROCEDURE — 99999 PR PBB SHADOW E&M-EST. PATIENT-LVL II: ICD-10-PCS | Mod: PBBFAC,,,

## 2020-01-15 PROCEDURE — 59025 PR FETAL 2N-STRESS TEST: ICD-10-PCS | Mod: 26,,, | Performed by: OBSTETRICS & GYNECOLOGY

## 2020-01-15 PROCEDURE — 76816 OB US FOLLOW-UP PER FETUS: CPT | Mod: PBBFAC | Performed by: OBSTETRICS & GYNECOLOGY

## 2020-01-15 PROCEDURE — 59025 FETAL NON-STRESS TEST: CPT

## 2020-01-15 PROCEDURE — 25000003 PHARM REV CODE 250: Performed by: STUDENT IN AN ORGANIZED HEALTH CARE EDUCATION/TRAINING PROGRAM

## 2020-01-15 PROCEDURE — 59025 FETAL NON-STRESS TEST: CPT | Mod: 26,,, | Performed by: OBSTETRICS & GYNECOLOGY

## 2020-01-15 PROCEDURE — 99212 OFFICE O/P EST SF 10 MIN: CPT | Mod: PBBFAC,TH,PN,25

## 2020-01-15 PROCEDURE — 99284 PR EMERGENCY DEPT VISIT,LEVEL IV: ICD-10-PCS | Mod: 25,,, | Performed by: OBSTETRICS & GYNECOLOGY

## 2020-01-15 RX ORDER — ONDANSETRON 4 MG/1
4 TABLET, ORALLY DISINTEGRATING ORAL EVERY 6 HOURS PRN
Qty: 20 TABLET | Refills: 1 | Status: SHIPPED | OUTPATIENT
Start: 2020-01-15 | End: 2020-03-02 | Stop reason: SDUPTHER

## 2020-01-15 RX ORDER — BUTALBITAL, ACETAMINOPHEN AND CAFFEINE 50; 325; 40 MG/1; MG/1; MG/1
2 TABLET ORAL
Status: COMPLETED | OUTPATIENT
Start: 2020-01-15 | End: 2020-01-15

## 2020-01-15 RX ORDER — HYDROXYPROGESTERONE CAPROATE 1250 MG/5ML
250 INJECTION INTRAMUSCULAR
Status: DISCONTINUED | OUTPATIENT
Start: 2020-01-16 | End: 2020-01-15

## 2020-01-15 RX ORDER — BUTALBITAL, ACETAMINOPHEN AND CAFFEINE 50; 325; 40 MG/1; MG/1; MG/1
1 TABLET ORAL EVERY 6 HOURS PRN
Qty: 20 TABLET | Refills: 1 | Status: SHIPPED | OUTPATIENT
Start: 2020-01-15 | End: 2020-02-14

## 2020-01-15 RX ORDER — ONDANSETRON 4 MG/1
4 TABLET, ORALLY DISINTEGRATING ORAL ONCE
Status: COMPLETED | OUTPATIENT
Start: 2020-01-15 | End: 2020-01-15

## 2020-01-15 RX ORDER — BUTALBITAL, ACETAMINOPHEN AND CAFFEINE 50; 325; 40 MG/1; MG/1; MG/1
1 TABLET ORAL EVERY 6 HOURS PRN
Qty: 20 TABLET | Refills: 1 | Status: SHIPPED | OUTPATIENT
Start: 2020-01-15 | End: 2020-01-15

## 2020-01-15 RX ORDER — HYDROXYPROGESTERONE CAPROATE 250 MG/ML
250 INJECTION INTRAMUSCULAR
Status: SHIPPED | OUTPATIENT
Start: 2020-01-15

## 2020-01-15 RX ADMIN — ONDANSETRON 4 MG: 4 TABLET, ORALLY DISINTEGRATING ORAL at 10:01

## 2020-01-15 RX ADMIN — BUTALBITAL, ACETAMINOPHEN AND CAFFEINE 2 TABLET: 50; 325; 40 TABLET ORAL at 10:01

## 2020-01-15 RX ADMIN — HYDROXYPROGESTERONE CAPROATE 250 MG: 250 INJECTION INTRAMUSCULAR at 02:01

## 2020-01-15 NOTE — DISCHARGE INSTRUCTIONS
"  Self-Care for Headaches  Most headaches aren't serious and can be relieved with self-care. But some headaches may be a sign of another health problem like eye trouble or high blood pressure. To find the best treatment, learn what kind of headaches you get. For tension headaches, self-care will usually help. To treat migraines, ask your healthcare provider for advice. It is also possible to get both tension and migraine headaches. Self-care involves relieving the pain and avoiding headache triggers if you can.    Ways to reduce pain and tension  Try these steps:  · Apply a cold compress or ice pack to the pain site.  · Drink fluids. If nausea makes it hard to drink, try sucking on ice.  · Rest. Protect yourself from bright light and loud noises.  · Calm your emotions by imagining a peaceful scene.  · Massage tight neck, shoulder, and head muscles.  · To relax muscles, soak in a hot bath or use a hot shower.  Use medicines  Aspirin or aspirin substitutes, such as ibuprofen and acetaminophen, can relieve headache. Remember: Never give aspirin to anyone 18 years old or younger because of the risk of developing Reye syndrome. Use pain medicines only when necessary.  Track your headaches  Keeping a headache diary can help you and your healthcare provider identify what's causing your headaches:  · Note when each headache happens.  · Identify your activities and the foods you've eaten 6 to 8 hours before the headache began.  · Look for any trends or "triggers."  Signs of tension headache  Any of the following can be signs:  · Dull pain or feeling of pressure in a tight band around your head  · Pain in your neck or shoulders  · Headache without a definite beginning or end  · Headache after an activity such as driving or working on a computer  Signs of migraine  Any of the following can be signs:  · Throbbing pain on one or both sides of your head  · Nausea or vomiting  · Extreme sensitivity to light, sound, and " "smells  · Bright spots, flashes, or other visual changes  · Pain or nausea so severe that you can't continue your daily activities  Call your healthcare provider   If you have any of the following symptoms, contact your healthcare provider:  · A headache that lingers after a recent injury or bump to the head.  · A fever with a stiff neck or pain when you bend your head toward your chest.  · A headache along with slurred speech, changes in your vision, or numbness or weakness in your arms or legs.  · A headache for longer than 3 days.  · Frequent headaches, especially in the morning.  · Headaches with seizures   · Seek immediate medical attention if you have a headache that you would call "the worst headache you have ever had."   Date Last Reviewed: 10/4/2015  © 8950-7765 [employer]. [address] [place] [address], [place] [address]. All rights reserved. This information is not intended as a substitute for professional medical care. Always follow your healthcare professional's instructions.        "

## 2020-01-15 NOTE — ED PROVIDER NOTES
Encounter Date: 1/15/2020       History     Chief Complaint   Patient presents with    Headache     Dannielle De Jesus is a 24 y.o. [id number] at 27w3d presents from [facility] clinic complaining of HA. The patient has a history of HA outside of pregnancy; has never been worked up by neurology. Has been taking Tylenol over the past few weeks for recurrent HA without significant relief. Endorses intermittent nausea without vomiting; has not taken any medication for relief. Denies any vision changes, SOB, new edema, CP, or SOB. Denies any urinary complaints.    This IUP is complicated by hx of PTD @ 36 weeks.  Patient denies contractions, denies vaginal bleeding, denies LOF. Fetal Movement: normal.        Review of patient's allergies indicates:  No Known Allergies  History reviewed. No pertinent past medical history.  History reviewed. No pertinent surgical history.  Family History   Problem Relation Age of Onset    Cancer Neg Hx      labor Neg Hx     Diabetes Neg Hx     Breast cancer Neg Hx     Colon cancer Neg Hx     Ovarian cancer Neg Hx      Social History     Tobacco Use    Smoking status: Never Smoker    Smokeless tobacco: Never Used   Substance Use Topics    Alcohol use: Not Currently     Comment: social use    Drug use: No     Review of Systems   Constitutional: Negative for chills, fatigue and fever.   HENT: Negative for congestion.    Eyes: Negative for visual disturbance.   Respiratory: Negative for shortness of breath.    Cardiovascular: Negative for chest pain.   Gastrointestinal: Negative for abdominal pain, nausea and vomiting.   Genitourinary: Negative for dysuria, pelvic pain, vaginal bleeding and vaginal discharge.   Musculoskeletal: Negative for back pain.   Skin: Negative for rash.   Neurological: Positive for headaches.       Physical Exam     Initial Vitals [01/15/20 1010]   BP Pulse Resp Temp SpO2   110/65 85 16 97.5 °F (36.4 °C) 100 %      MAP       --         Physical Exam    Vitals  reviewed.  Constitutional: She appears well-developed and well-nourished. She is not diaphoretic. No distress.   HENT:   Head: Normocephalic and atraumatic.   Eyes: Conjunctivae and EOM are normal. Right eye exhibits no discharge. Left eye exhibits no discharge.   Neck: Normal range of motion.   Cardiovascular: Normal rate.   Pulmonary/Chest: Breath sounds normal. No respiratory distress. She has no wheezes. She has no rhonchi. She has no rales.   Abdominal: Soft. There is no tenderness. There is no rebound and no guarding.   Gravid uterus   Musculoskeletal: Normal range of motion. She exhibits no edema.   Neurological: She is alert and oriented to person, place, and time.   Skin: Skin is warm and dry. No rash noted. No erythema.   Psychiatric: She has a normal mood and affect. Her behavior is normal. Judgment and thought content normal.       ED Course   Obtain Fetal nonstress test (NST)  Date/Time: 1/15/2020 11:30 AM  Performed by: Yaneth Hawkins MD  Authorized by: Yaneth Hawkins MD     Nonstress Test:     Variability:  6-25 BPM    Decelerations:  None    Accelerations:  15 bpm    Baseline:  135    Contractions:  Not present  Biophysical Profile:     Nonstress Test Interpretation: reactive      Overall Impression:  Reassuring      Labs Reviewed   COMPREHENSIVE METABOLIC PANEL - Abnormal; Notable for the following components:       Result Value    CO2 22 (*)     Albumin 2.6 (*)     AST 9 (*)     ALT 8 (*)     All other components within normal limits   CBC W/ AUTO DIFFERENTIAL - Abnormal; Notable for the following components:    RBC 3.22 (*)     Hemoglobin 8.8 (*)     Hematocrit 26.7 (*)     Platelets 356 (*)     Immature Grans (Abs) 0.05 (*)     Gran% 78.1 (*)     Lymph% 12.6 (*)     All other components within normal limits   PROTEIN / CREATININE RATIO, URINE - Abnormal; Notable for the following components:    Protein, Urine Random 23 (*)     All other components within normal limits          Imaging  Results    None          Medical Decision Making:   Initial Assessment:   Dannielle De Jesus is a 24 y.o. [id number] at 27w3d presents from [facility] clinic complaining of HA.    ED Management:  - VSS, all BP wnl  - NST reactive and appropriate for gestational age  - CBC, CMP, Urine P:C ordered  - Zofran and Fioricet for HA and nausea  - PCR: 0.12, AST/ALT 9/8, Cr 0.6  Plt 356  - HH 8.8/26.7- patient counseled on PO fe intake   - Patient HA better with fioricet  Other:   I have discussed this case with another health care provider.       <> Summary of the Discussion: Dr. Tomas Marin              Attending Attestation:   Physician Attestation Statement for Resident:  As the supervising MD   Physician Attestation Statement: I have personally seen and examined this patient.   I agree with the above history. -:   As the supervising MD I agree with the above PE.    As the supervising MD I agree with the above treatment, course, plan, and disposition.  I was personally present during the critical portions of the procedure(s) performed by the resident and was immediately available in the ED to provide services and assistance as needed during the entire procedure.  I have reviewed and agree with the residents interpretation of the following: lab data and rhythm strips.  I have reviewed the following: old records at this facility.                    ED Course as of 2051   Wed Tj 15, 2020   1029 I evaluated Ms. De Jesus and discussed plan with Dr. Thacker.  Pt presents at 27w3d with headache over last couple of days, not relieved by tylenol.  Occ visual spotting.  No other s/sx of pre-e.  No ob complaints.  Fetal status reassuring.  Will get pre-e labs, fioricet and zofran.     [HU]      ED Course User Index  [HU] Sunni Marin DO            Clinical Impression:       ICD-10-CM ICD-9-CM   1. Headache in pregnancy, third trimester O26.893 646.83    R51 784.0   2. 27 weeks gestation of pregnancy Z3A.27 V22.2      Disposition:   Disposition: Discharged  Condition: Stable    Yaneth Hawkins M.D.   OB/GYN  PGY-1          Yaneth Hawkins MD  Resident  01/15/20 1130       Sunni Marin DO  01/22/20 2052

## 2020-01-16 NOTE — PROGRESS NOTES
Obstetrical ultrasound completed today. See report in imaging section of [facility].

## 2020-01-17 ENCOUNTER — ROUTINE PRENATAL (OUTPATIENT)
Dept: OBSTETRICS AND GYNECOLOGY | Facility: CLINIC | Age: 25
End: 2020-01-17
Payer: MEDICAID

## 2020-01-17 VITALS
WEIGHT: 152.31 LBS | SYSTOLIC BLOOD PRESSURE: 110 MMHG | BODY MASS INDEX: 27.86 KG/M2 | DIASTOLIC BLOOD PRESSURE: 66 MMHG

## 2020-01-17 DIAGNOSIS — O09.92 SUPERVISION OF HIGH RISK PREGNANCY IN SECOND TRIMESTER: Primary | ICD-10-CM

## 2020-01-17 PROCEDURE — 99213 OFFICE O/P EST LOW 20 MIN: CPT | Mod: PBBFAC,TH,PN | Performed by: OBSTETRICS & GYNECOLOGY

## 2020-01-17 PROCEDURE — 99213 PR OFFICE/OUTPT VISIT, EST, LEVL III, 20-29 MIN: ICD-10-PCS | Mod: TH,S$PBB,, | Performed by: OBSTETRICS & GYNECOLOGY

## 2020-01-17 PROCEDURE — 99999 PR PBB SHADOW E&M-EST. PATIENT-LVL III: CPT | Mod: PBBFAC,,, | Performed by: OBSTETRICS & GYNECOLOGY

## 2020-01-17 PROCEDURE — 99999 PR PBB SHADOW E&M-EST. PATIENT-LVL III: ICD-10-PCS | Mod: PBBFAC,,, | Performed by: OBSTETRICS & GYNECOLOGY

## 2020-01-17 PROCEDURE — 99213 OFFICE O/P EST LOW 20 MIN: CPT | Mod: TH,S$PBB,, | Performed by: OBSTETRICS & GYNECOLOGY

## 2020-01-17 NOTE — PROGRESS NOTES
Doing well, denies HA  Discussed need for PO iron.  Reports good FM, denies CTX, LOF, VB    Tdap with Sunni next week.  Glucose screen set up.    Discussed contraindications to water birth, placental encapsulation.

## 2020-01-22 ENCOUNTER — CLINICAL SUPPORT (OUTPATIENT)
Dept: OBSTETRICS AND GYNECOLOGY | Facility: CLINIC | Age: 25
End: 2020-01-22
Payer: MEDICAID

## 2020-01-22 ENCOUNTER — TELEPHONE (OUTPATIENT)
Dept: OBSTETRICS AND GYNECOLOGY | Facility: CLINIC | Age: 25
End: 2020-01-22

## 2020-01-22 DIAGNOSIS — O09.92 SUPERVISION OF HIGH RISK PREGNANCY IN SECOND TRIMESTER: ICD-10-CM

## 2020-01-22 PROCEDURE — 90471 IMMUNIZATION ADMIN: CPT | Mod: PBBFAC,PN

## 2020-01-22 PROCEDURE — 99212 OFFICE O/P EST SF 10 MIN: CPT | Mod: PBBFAC,TH,PN

## 2020-01-22 PROCEDURE — 99999 PR PBB SHADOW E&M-EST. PATIENT-LVL II: ICD-10-PCS | Mod: PBBFAC,,,

## 2020-01-22 PROCEDURE — 96372 THER/PROPH/DIAG INJ SC/IM: CPT | Mod: PBBFAC,PN

## 2020-01-22 PROCEDURE — 99999 PR PBB SHADOW E&M-EST. PATIENT-LVL II: CPT | Mod: PBBFAC,,,

## 2020-01-22 RX ADMIN — HYDROXYPROGESTERONE CAPROATE 250 MG: 250 INJECTION INTRAMUSCULAR at 11:01

## 2020-01-22 NOTE — TELEPHONE ENCOUNTER
Spoke with Meredith from [employer]. Advised her pt's first [place] was 1/15/20. No further questions or concerns.

## 2020-01-22 NOTE — TELEPHONE ENCOUNTER
----- Message from Porsche Carlos sent at 1/22/2020  4:21 PM CST -----  Contact: Meredith with [employer]   Type: Patient Call Back    Who called: Meredith with [employer]     What is the request in detail: Meredith is requesting a call back. She states that she just needs to know when patient had her first 17 P. She states that you can leave a VM her box is secure.   Please contact to further advise.    Can the clinic reply by [facility]? No    Best call back number: [phone]    Additional Information: N/A

## 2020-01-22 NOTE — PROGRESS NOTES
Pt here for hydroxyprogesterone caproate injection (Sunni). Injection given to pt in Left Gluteus at 11:14 am.  Pt had no complaint of pain prior to or after injection.  Instructed pt to stay in facility for 15 minutes and report any adverse reactions.  Pt verbalized understanding.        [place] (250 mg)        Lot: [id number]  Exp: 12/2020

## 2020-01-22 NOTE — PROGRESS NOTES
Pt here for Tdap vaccine. No complaints of pain before or after injection. Injection given in the Left Deltoid at 11:12 am. Aspirated and no blood return noted. Pt advised to wait 15 minutes in lobby and report any adverse reactions. Pt verbalized understanding.     Lot [id number]  Exp 11/13/21

## 2020-01-29 ENCOUNTER — CLINICAL SUPPORT (OUTPATIENT)
Dept: OBSTETRICS AND GYNECOLOGY | Facility: CLINIC | Age: 25
End: 2020-01-29
Payer: MEDICAID

## 2020-01-29 DIAGNOSIS — O09.899 HISTORY OF PRETERM DELIVERY, CURRENTLY PREGNANT: Primary | ICD-10-CM

## 2020-01-29 PROCEDURE — 99999 PR PBB SHADOW E&M-EST. PATIENT-LVL II: ICD-10-PCS | Mod: PBBFAC,,,

## 2020-01-29 PROCEDURE — 99999 PR PBB SHADOW E&M-EST. PATIENT-LVL II: CPT | Mod: PBBFAC,,,

## 2020-01-29 PROCEDURE — 96372 THER/PROPH/DIAG INJ SC/IM: CPT | Mod: PBBFAC,PN

## 2020-01-29 PROCEDURE — 99212 OFFICE O/P EST SF 10 MIN: CPT | Mod: PBBFAC,TH,PN,25

## 2020-01-29 RX ADMIN — HYDROXYPROGESTERONE CAPROATE 250 MG: 250 INJECTION INTRAMUSCULAR at 11:01

## 2020-01-29 NOTE — PROGRESS NOTES
Pt here for hydroxyprogesterone caproate injection (Sunni). Injection given to pt in Right Gluteus at 11:15 am.  Pt had no complaint of pain prior to or after injection.  Instructed pt to stay in facility for 15 minutes and report any adverse reactions.  Pt verbalized understanding.        [place] (250 mg)        Lot: [id number]  Exp: 12/2020

## 2020-02-05 ENCOUNTER — CLINICAL SUPPORT (OUTPATIENT)
Dept: OBSTETRICS AND GYNECOLOGY | Facility: CLINIC | Age: 25
End: 2020-02-05
Payer: MEDICAID

## 2020-02-05 ENCOUNTER — TELEPHONE (OUTPATIENT)
Dept: PHARMACY | Facility: CLINIC | Age: 25
End: 2020-02-05

## 2020-02-05 DIAGNOSIS — O09.899 HISTORY OF PRETERM DELIVERY, CURRENTLY PREGNANT: Primary | ICD-10-CM

## 2020-02-05 PROCEDURE — 96372 THER/PROPH/DIAG INJ SC/IM: CPT | Mod: PBBFAC,PN

## 2020-02-05 PROCEDURE — 99212 OFFICE O/P EST SF 10 MIN: CPT | Mod: PBBFAC,TH,PN

## 2020-02-05 PROCEDURE — 99999 PR PBB SHADOW E&M-EST. PATIENT-LVL II: CPT | Mod: PBBFAC,,,

## 2020-02-05 PROCEDURE — 99999 PR PBB SHADOW E&M-EST. PATIENT-LVL II: ICD-10-PCS | Mod: PBBFAC,,,

## 2020-02-05 RX ADMIN — HYDROXYPROGESTERONE CAPROATE 250 MG: 250 INJECTION INTRAMUSCULAR at 11:02

## 2020-02-05 NOTE — PROGRESS NOTES
Pt here for hydroxyprogesterone caproate injection (Sunni). Injection given to pt in Left Gluteus at 11:23 am.  Pt had no complaint of pain prior to or after injection.  Instructed pt to stay in facility for 15 minutes and report any adverse reactions.  Pt verbalized understanding.        [place] (250 mg)        Lot: [id number]  Exp: 12/2020

## 2020-02-05 NOTE — TELEPHONE ENCOUNTER
Claim is rejecting for patient having Primary Coverage. Conducted Eligibility Check with no other results besides the insurance that is currently rejecting. Called patient to inform her and see if she has other coverage. If not she would have to call her Insurance and inform them to be able to get a paid claim. No answer-- left a voicemail and awaiting a call back..

## 2020-02-10 ENCOUNTER — TELEPHONE (OUTPATIENT)
Dept: PHARMACY | Facility: CLINIC | Age: 25
End: 2020-02-10

## 2020-02-12 ENCOUNTER — CLINICAL SUPPORT (OUTPATIENT)
Dept: OBSTETRICS AND GYNECOLOGY | Facility: CLINIC | Age: 25
End: 2020-02-12
Payer: MEDICAID

## 2020-02-12 ENCOUNTER — ROUTINE PRENATAL (OUTPATIENT)
Dept: OBSTETRICS AND GYNECOLOGY | Facility: CLINIC | Age: 25
End: 2020-02-12
Payer: MEDICAID

## 2020-02-12 VITALS — BODY MASS INDEX: 27.8 KG/M2 | SYSTOLIC BLOOD PRESSURE: 112 MMHG | WEIGHT: 152 LBS | DIASTOLIC BLOOD PRESSURE: 68 MMHG

## 2020-02-12 DIAGNOSIS — O09.92 SUPERVISION OF HIGH RISK PREGNANCY IN SECOND TRIMESTER: ICD-10-CM

## 2020-02-12 DIAGNOSIS — O09.899 HISTORY OF PRETERM DELIVERY, CURRENTLY PREGNANT: ICD-10-CM

## 2020-02-12 DIAGNOSIS — O09.899 HISTORY OF PRETERM DELIVERY, CURRENTLY PREGNANT: Primary | ICD-10-CM

## 2020-02-12 DIAGNOSIS — O09.93 SUPERVISION OF HIGH RISK PREGNANCY IN THIRD TRIMESTER: Primary | ICD-10-CM

## 2020-02-12 PROCEDURE — 99213 OFFICE O/P EST LOW 20 MIN: CPT | Mod: PBBFAC,27,TH,PN | Performed by: OBSTETRICS & GYNECOLOGY

## 2020-02-12 PROCEDURE — 99213 OFFICE O/P EST LOW 20 MIN: CPT | Mod: TH,S$PBB,, | Performed by: OBSTETRICS & GYNECOLOGY

## 2020-02-12 PROCEDURE — 99999 PR PBB SHADOW E&M-EST. PATIENT-LVL III: ICD-10-PCS | Mod: PBBFAC,,, | Performed by: OBSTETRICS & GYNECOLOGY

## 2020-02-12 PROCEDURE — 99213 PR OFFICE/OUTPT VISIT, EST, LEVL III, 20-29 MIN: ICD-10-PCS | Mod: TH,S$PBB,, | Performed by: OBSTETRICS & GYNECOLOGY

## 2020-02-12 PROCEDURE — 99999 PR PBB SHADOW E&M-EST. PATIENT-LVL III: CPT | Mod: PBBFAC,,, | Performed by: OBSTETRICS & GYNECOLOGY

## 2020-02-12 PROCEDURE — 99999 PR PBB SHADOW E&M-EST. PATIENT-LVL II: ICD-10-PCS | Mod: PBBFAC,,,

## 2020-02-12 PROCEDURE — 99212 OFFICE O/P EST SF 10 MIN: CPT | Mod: PBBFAC,TH,PN,25

## 2020-02-12 PROCEDURE — 96372 THER/PROPH/DIAG INJ SC/IM: CPT | Mod: PBBFAC,PN

## 2020-02-12 PROCEDURE — 99999 PR PBB SHADOW E&M-EST. PATIENT-LVL II: CPT | Mod: PBBFAC,,,

## 2020-02-12 RX ADMIN — HYDROXYPROGESTERONE CAPROATE 250 MG: 250 INJECTION INTRAMUSCULAR at 11:02

## 2020-02-12 NOTE — PROGRESS NOTES
Pt here for hydroxyprogesterone caproate injection (Sunni). Injection given to pt in Right Gluteus at 11:05 am.  Pt had no complaint of pain prior to or after injection.  Instructed pt to stay in facility for 15 minutes and report any adverse reactions.  Pt verbalized understanding.        [place] (250 mg)        Lot: [id number]  Exp: 12/2020

## 2020-02-12 NOTE — PROGRESS NOTES
Patient is doing well.  Reports normal fetal movement.   Denies CTX, VB, LOF.    Missed her glucose screen, will go today.  Labor precautions given.  Reviewed kick-counts.

## 2020-02-19 ENCOUNTER — CLINICAL SUPPORT (OUTPATIENT)
Dept: OBSTETRICS AND GYNECOLOGY | Facility: CLINIC | Age: 25
End: 2020-02-19
Payer: MEDICAID

## 2020-02-19 DIAGNOSIS — O09.899 HISTORY OF PRETERM DELIVERY, CURRENTLY PREGNANT: Primary | ICD-10-CM

## 2020-02-19 PROCEDURE — 96372 THER/PROPH/DIAG INJ SC/IM: CPT | Mod: PBBFAC,PN

## 2020-02-19 PROCEDURE — 99999 PR PBB SHADOW E&M-EST. PATIENT-LVL II: ICD-10-PCS | Mod: PBBFAC,,,

## 2020-02-19 PROCEDURE — 99999 PR PBB SHADOW E&M-EST. PATIENT-LVL II: CPT | Mod: PBBFAC,,,

## 2020-02-19 PROCEDURE — 99212 OFFICE O/P EST SF 10 MIN: CPT | Mod: PBBFAC,TH,PN

## 2020-02-19 RX ADMIN — HYDROXYPROGESTERONE CAPROATE 250 MG: 250 INJECTION INTRAMUSCULAR at 11:02

## 2020-02-19 NOTE — PROGRESS NOTES
Pt here for hydroxyprogesterone caproate injection (Sunni). Injection given to pt in Left Gluteus at 11:15 am.  Pt had no complaint of pain prior to or after injection.  Instructed pt to stay in facility for 15 minutes and report any adverse reactions.  Pt verbalized understanding.        [place] (250 mg)        Lot:[id number]  Exp: 12/2020

## 2020-02-26 ENCOUNTER — CLINICAL SUPPORT (OUTPATIENT)
Dept: OBSTETRICS AND GYNECOLOGY | Facility: CLINIC | Age: 25
End: 2020-02-26
Payer: MEDICAID

## 2020-02-26 DIAGNOSIS — O09.899 HISTORY OF PRETERM DELIVERY, CURRENTLY PREGNANT: Primary | ICD-10-CM

## 2020-02-26 PROCEDURE — 99999 PR PBB SHADOW E&M-EST. PATIENT-LVL I: CPT | Mod: PBBFAC,,,

## 2020-02-26 PROCEDURE — 99211 OFF/OP EST MAY X REQ PHY/QHP: CPT | Mod: PBBFAC,TH,PN,25

## 2020-02-26 PROCEDURE — 96372 THER/PROPH/DIAG INJ SC/IM: CPT | Mod: PBBFAC,PN

## 2020-02-26 PROCEDURE — 99999 PR PBB SHADOW E&M-EST. PATIENT-LVL I: ICD-10-PCS | Mod: PBBFAC,,,

## 2020-02-26 RX ADMIN — HYDROXYPROGESTERONE CAPROATE 250 MG: 250 INJECTION INTRAMUSCULAR at 11:02

## 2020-02-26 NOTE — PROGRESS NOTES
Pt here for hydroxyprogesterone caproate injection (Sunni). Injection given to pt in Right Gluteus at 11:08 am.  Pt had no complaint of pain prior to or after injection.  Instructed pt to stay in facility for 15 minutes and report any adverse reactions.  Pt verbalized understanding.        [place] (250 mg)        Lot: [id number]  Exp: 12/2020

## 2020-03-04 ENCOUNTER — CLINICAL SUPPORT (OUTPATIENT)
Dept: OBSTETRICS AND GYNECOLOGY | Facility: CLINIC | Age: 25
End: 2020-03-04
Payer: MEDICAID

## 2020-03-04 ENCOUNTER — ROUTINE PRENATAL (OUTPATIENT)
Dept: OBSTETRICS AND GYNECOLOGY | Facility: CLINIC | Age: 25
End: 2020-03-04
Payer: MEDICAID

## 2020-03-04 VITALS
BODY MASS INDEX: 28.43 KG/M2 | SYSTOLIC BLOOD PRESSURE: 114 MMHG | WEIGHT: 155.44 LBS | DIASTOLIC BLOOD PRESSURE: 62 MMHG

## 2020-03-04 DIAGNOSIS — O09.93 SUPERVISION OF HIGH RISK PREGNANCY IN THIRD TRIMESTER: Primary | ICD-10-CM

## 2020-03-04 DIAGNOSIS — O09.899 HISTORY OF PRETERM DELIVERY, CURRENTLY PREGNANT: Primary | ICD-10-CM

## 2020-03-04 PROCEDURE — 96372 THER/PROPH/DIAG INJ SC/IM: CPT | Mod: PBBFAC,PN

## 2020-03-04 PROCEDURE — 99213 PR OFFICE/OUTPT VISIT, EST, LEVL III, 20-29 MIN: ICD-10-PCS | Mod: TH,S$PBB,, | Performed by: OBSTETRICS & GYNECOLOGY

## 2020-03-04 PROCEDURE — 99999 PR PBB SHADOW E&M-EST. PATIENT-LVL III: CPT | Mod: PBBFAC,,, | Performed by: OBSTETRICS & GYNECOLOGY

## 2020-03-04 PROCEDURE — 99212 OFFICE O/P EST SF 10 MIN: CPT | Mod: PBBFAC,TH,PN,25

## 2020-03-04 PROCEDURE — 99999 PR PBB SHADOW E&M-EST. PATIENT-LVL II: ICD-10-PCS | Mod: PBBFAC,,,

## 2020-03-04 PROCEDURE — 99999 PR PBB SHADOW E&M-EST. PATIENT-LVL II: CPT | Mod: PBBFAC,,,

## 2020-03-04 PROCEDURE — 99999 PR PBB SHADOW E&M-EST. PATIENT-LVL III: ICD-10-PCS | Mod: PBBFAC,,, | Performed by: OBSTETRICS & GYNECOLOGY

## 2020-03-04 PROCEDURE — 99213 OFFICE O/P EST LOW 20 MIN: CPT | Mod: PBBFAC,27,TH,PN | Performed by: OBSTETRICS & GYNECOLOGY

## 2020-03-04 PROCEDURE — 99213 OFFICE O/P EST LOW 20 MIN: CPT | Mod: TH,S$PBB,, | Performed by: OBSTETRICS & GYNECOLOGY

## 2020-03-04 RX ADMIN — HYDROXYPROGESTERONE CAPROATE 250 MG: 250 INJECTION INTRAMUSCULAR at 03:03

## 2020-03-04 NOTE — PROGRESS NOTES
Patient is doing well. C/o of menstrual cramp. Cervix 1cm today, hi, 50%  Reports normal fetal movement.   Denies CTX, VB, LOF.    Labor precautions given.  Reviewed kick-counts.    Needs GBS, 3T labs next visit

## 2020-03-04 NOTE — PROGRESS NOTES
Pt here for hydroxyprogesterone caproate injection (Sunni). Injection given to pt in Left Gluteus at 3:33pm.  Pt had no complaint of pain prior to or after injection.  Instructed pt to stay in facility for 15 minutes and report any adverse reactions.  Pt verbalized understanding.        [place] (250 mg)        Lot: [id number]  Exp: 12/2020  Manu: [facility]

## 2020-03-05 ENCOUNTER — TELEPHONE (OUTPATIENT)
Dept: PHARMACY | Facility: CLINIC | Age: 25
End: 2020-03-05

## 2020-03-10 ENCOUNTER — TELEPHONE (OUTPATIENT)
Dept: OBSTETRICS AND GYNECOLOGY | Facility: CLINIC | Age: 25
End: 2020-03-10

## 2020-03-10 NOTE — TELEPHONE ENCOUNTER
----- Message from Mile Maria sent at 3/10/2020 10:33 AM CDT -----  Contact: ALEXIS COFFMAN    Type:  Patient Returning Call    Who Called: ALEXIS COFFMAN     Who Left Message for Patient:darryn    Does the patient know what this is regarding?no    Best Call Back Number:[phone]    Additional Information:

## 2020-03-12 ENCOUNTER — ROUTINE PRENATAL (OUTPATIENT)
Dept: OBSTETRICS AND GYNECOLOGY | Facility: CLINIC | Age: 25
End: 2020-03-12
Payer: MEDICAID

## 2020-03-12 ENCOUNTER — TELEPHONE (OUTPATIENT)
Dept: PHARMACY | Facility: CLINIC | Age: 25
End: 2020-03-12

## 2020-03-12 ENCOUNTER — CLINICAL SUPPORT (OUTPATIENT)
Dept: OBSTETRICS AND GYNECOLOGY | Facility: CLINIC | Age: 25
End: 2020-03-12
Payer: MEDICAID

## 2020-03-12 VITALS
BODY MASS INDEX: 28.77 KG/M2 | WEIGHT: 157.31 LBS | SYSTOLIC BLOOD PRESSURE: 106 MMHG | DIASTOLIC BLOOD PRESSURE: 66 MMHG

## 2020-03-12 DIAGNOSIS — Z3A.35 35 WEEKS GESTATION OF PREGNANCY: Primary | ICD-10-CM

## 2020-03-12 DIAGNOSIS — O09.899 HISTORY OF PRETERM DELIVERY, CURRENTLY PREGNANT: Primary | ICD-10-CM

## 2020-03-12 PROCEDURE — 99212 OFFICE O/P EST SF 10 MIN: CPT | Mod: PBBFAC,27,PN | Performed by: NURSE PRACTITIONER

## 2020-03-12 PROCEDURE — 96372 THER/PROPH/DIAG INJ SC/IM: CPT | Mod: PBBFAC,PN

## 2020-03-12 PROCEDURE — 99999 PR PBB SHADOW E&M-EST. PATIENT-LVL II: ICD-10-PCS | Mod: PBBFAC,,, | Performed by: NURSE PRACTITIONER

## 2020-03-12 PROCEDURE — 99999 PR PBB SHADOW E&M-EST. PATIENT-LVL II: CPT | Mod: PBBFAC,,,

## 2020-03-12 PROCEDURE — 99212 OFFICE O/P EST SF 10 MIN: CPT | Mod: PBBFAC,TH,PN,25

## 2020-03-12 PROCEDURE — 99212 PR OFFICE/OUTPT VISIT, EST, LEVL II, 10-19 MIN: ICD-10-PCS | Mod: TH,S$PBB,, | Performed by: NURSE PRACTITIONER

## 2020-03-12 PROCEDURE — 87081 CULTURE SCREEN ONLY: CPT

## 2020-03-12 PROCEDURE — 99999 PR PBB SHADOW E&M-EST. PATIENT-LVL II: ICD-10-PCS | Mod: PBBFAC,,,

## 2020-03-12 PROCEDURE — 99212 OFFICE O/P EST SF 10 MIN: CPT | Mod: TH,S$PBB,, | Performed by: NURSE PRACTITIONER

## 2020-03-12 PROCEDURE — 99999 PR PBB SHADOW E&M-EST. PATIENT-LVL II: CPT | Mod: PBBFAC,,, | Performed by: NURSE PRACTITIONER

## 2020-03-12 RX ADMIN — HYDROXYPROGESTERONE CAPROATE 250 MG: 250 INJECTION INTRAMUSCULAR at 03:03

## 2020-03-12 NOTE — PROGRESS NOTES
Pt here for hydroxyprogesterone caproate injection (Sunni). Injection given to pt in Right Gluteus at 3:56pm.  Pt had no complaint of pain prior to or after injection.  Instructed pt to stay in facility for 15 minutes and report any adverse reactions.  Pt verbalized understanding.        [place] (250 mg)        Lot: [id number]  Exp: 03/2021

## 2020-03-12 NOTE — PROGRESS NOTES
Here for routine OB appt at 35w4d, with complaints of back pain.  Reports good FM.  Denies LOF, denies VB, Report lianet [place] contractions.  GBS and 3T labs done today  Reviewed warning signs of Labor and Preeclampsia.  Daily FM counts reinforced.  F/U scheduled 1 week

## 2020-03-12 NOTE — TELEPHONE ENCOUNTER
Incoming call regarding Sunni at [facility]. Will prepare for  with consent of patient and Gabby Rosales LPN. Copay 0.00. Patient has not started any new medications including OTC drugs. Patient has not had any medication/ dose or instruction changes. No new allergies or side effects reported with this shipment. Medication is being taken as prescribed by physician and properly stored. Two patient identifiers:  and Address verified. Apt today 3/12 at 3:30  Nurse asked that patient receive 2 injections with this  because she's gonna be considered full term after that.     Delivery Address:  [address]  [address]   [place] [address]

## 2020-03-14 LAB — BACTERIA SPEC AEROBE CULT: NORMAL

## 2020-03-17 ENCOUNTER — HOSPITAL ENCOUNTER (EMERGENCY)
Facility: OTHER | Age: 25
Discharge: HOME | End: 2020-03-17
Attending: OBSTETRICS & GYNECOLOGY
Payer: MEDICAID

## 2020-03-17 VITALS
OXYGEN SATURATION: 98 % | SYSTOLIC BLOOD PRESSURE: 108 MMHG | DIASTOLIC BLOOD PRESSURE: 70 MMHG | RESPIRATION RATE: 16 BRPM | HEART RATE: 80 BPM | TEMPERATURE: 99 F

## 2020-03-17 DIAGNOSIS — O47.9 UTERINE CONTRACTIONS DURING PREGNANCY: Primary | ICD-10-CM

## 2020-03-17 DIAGNOSIS — Z3A.36 36 WEEKS GESTATION OF PREGNANCY: ICD-10-CM

## 2020-03-17 PROCEDURE — 59025 FETAL NON-STRESS TEST: CPT

## 2020-03-17 PROCEDURE — 99283 EMERGENCY DEPT VISIT LOW MDM: CPT | Mod: 25,,, | Performed by: OBSTETRICS & GYNECOLOGY

## 2020-03-17 PROCEDURE — 99283 PR EMERGENCY DEPT VISIT,LEVEL III: ICD-10-PCS | Mod: 25,,, | Performed by: OBSTETRICS & GYNECOLOGY

## 2020-03-17 PROCEDURE — 59025 FETAL NON-STRESS TEST: CPT | Mod: 26,,, | Performed by: OBSTETRICS & GYNECOLOGY

## 2020-03-17 PROCEDURE — 59025 PR FETAL 2N-STRESS TEST: ICD-10-PCS | Mod: 26,,, | Performed by: OBSTETRICS & GYNECOLOGY

## 2020-03-17 PROCEDURE — 99284 EMERGENCY DEPT VISIT MOD MDM: CPT | Mod: 25

## 2020-03-18 ENCOUNTER — CLINICAL SUPPORT (OUTPATIENT)
Dept: OBSTETRICS AND GYNECOLOGY | Facility: CLINIC | Age: 25
End: 2020-03-18
Payer: MEDICAID

## 2020-03-18 ENCOUNTER — ROUTINE PRENATAL (OUTPATIENT)
Dept: OBSTETRICS AND GYNECOLOGY | Facility: CLINIC | Age: 25
End: 2020-03-18
Payer: MEDICAID

## 2020-03-18 ENCOUNTER — TELEPHONE (OUTPATIENT)
Dept: ADMINISTRATIVE | Facility: CLINIC | Age: 25
End: 2020-03-18

## 2020-03-18 ENCOUNTER — TELEPHONE (OUTPATIENT)
Dept: OBSTETRICS AND GYNECOLOGY | Facility: CLINIC | Age: 25
End: 2020-03-18

## 2020-03-18 VITALS
SYSTOLIC BLOOD PRESSURE: 110 MMHG | DIASTOLIC BLOOD PRESSURE: 64 MMHG | BODY MASS INDEX: 27.96 KG/M2 | WEIGHT: 152.88 LBS

## 2020-03-18 DIAGNOSIS — O09.899 HISTORY OF PRETERM DELIVERY, CURRENTLY PREGNANT: Primary | ICD-10-CM

## 2020-03-18 DIAGNOSIS — O09.93 SUPERVISION OF HIGH RISK PREGNANCY IN THIRD TRIMESTER: Primary | ICD-10-CM

## 2020-03-18 PROCEDURE — 99999 PR PBB SHADOW E&M-EST. PATIENT-LVL II: CPT | Mod: PBBFAC,,,

## 2020-03-18 PROCEDURE — 96372 THER/PROPH/DIAG INJ SC/IM: CPT | Mod: PBBFAC,PN

## 2020-03-18 PROCEDURE — 99213 OFFICE O/P EST LOW 20 MIN: CPT | Mod: PBBFAC,27,TH,PN | Performed by: OBSTETRICS & GYNECOLOGY

## 2020-03-18 PROCEDURE — 87491 CHLMYD TRACH DNA AMP PROBE: CPT

## 2020-03-18 PROCEDURE — 99999 PR PBB SHADOW E&M-EST. PATIENT-LVL III: CPT | Mod: PBBFAC,,, | Performed by: OBSTETRICS & GYNECOLOGY

## 2020-03-18 PROCEDURE — 99999 PR PBB SHADOW E&M-EST. PATIENT-LVL II: ICD-10-PCS | Mod: PBBFAC,,,

## 2020-03-18 PROCEDURE — 99212 OFFICE O/P EST SF 10 MIN: CPT | Mod: PBBFAC,TH,PN

## 2020-03-18 PROCEDURE — 99999 PR PBB SHADOW E&M-EST. PATIENT-LVL III: ICD-10-PCS | Mod: PBBFAC,,, | Performed by: OBSTETRICS & GYNECOLOGY

## 2020-03-18 PROCEDURE — 99213 OFFICE O/P EST LOW 20 MIN: CPT | Mod: TH,S$PBB,, | Performed by: OBSTETRICS & GYNECOLOGY

## 2020-03-18 PROCEDURE — 99213 PR OFFICE/OUTPT VISIT, EST, LEVL III, 20-29 MIN: ICD-10-PCS | Mod: TH,S$PBB,, | Performed by: OBSTETRICS & GYNECOLOGY

## 2020-03-18 RX ADMIN — HYDROXYPROGESTERONE CAPROATE 250 MG: 250 INJECTION INTRAMUSCULAR at 02:03

## 2020-03-18 NOTE — PROGRESS NOTES
Patient is doing well.  Reports normal fetal movement.   Denies CTX, VB, LOF.    Labor precautions given.  Reviewed kick-counts.    GC\CT collected

## 2020-03-18 NOTE — TELEPHONE ENCOUNTER
----- Message from Doyle Watts sent at 3/18/2020 12:24 PM CDT -----  Contact: ALEXIS COFFMAN [[id number]]   Name of Who is Calling:     What is the request in detail: patient request call back in reference  To  appointment  Please contact to further discuss and advise      Can the clinic reply by [facility]: no     What [facility]:  [phone]

## 2020-03-18 NOTE — PROGRESS NOTES
Pt here for hydroxyprogesterone caproate injection (Sunni). Injection given to pt in Left Gluteus at 2:57pm.  Pt had no complaint of pain prior to or after injection.  Instructed pt to stay in facility for 15 minutes and report any adverse reactions.  Pt verbalized understanding.        [place] (250 mg)        Lot: [id number]  Exp: 03/2021

## 2020-03-18 NOTE — ED PROVIDER NOTES
Encounter Date: 3/17/2020       History     Chief Complaint   Patient presents with    Contractions     Dannielle De Jesus is a 24 y.o. [id number] at 36w2d presents complaining of contractions every 3-5 mins for the past day. H/o  labor at 35 and 36 weeks in past pregnancies. Denies medical problem. On robin.   This IUP is complicated by h/o  delivery x 2 .  Patient denies contractions, reports vaginal bleeding, denies LOF.   Fetal Movement: normal.          Review of patient's allergies indicates:  No Known Allergies  No past medical history on file.  No past surgical history on file.  Family History   Problem Relation Age of Onset    Cancer Neg Hx      labor Neg Hx     Diabetes Neg Hx     Breast cancer Neg Hx     Colon cancer Neg Hx     Ovarian cancer Neg Hx      Social History     Tobacco Use    Smoking status: Never Smoker    Smokeless tobacco: Never Used   Substance Use Topics    Alcohol use: Not Currently     Comment: social use    Drug use: No     Review of Systems   Constitutional: Negative for chills, fatigue and fever.   HENT: Negative for congestion.    Eyes: Negative for visual disturbance.   Respiratory: Negative for cough and shortness of breath.    Cardiovascular: Negative for chest pain and palpitations.   Gastrointestinal: Positive for abdominal pain. Negative for abdominal distention, constipation, diarrhea, nausea and vomiting.   Genitourinary: Negative for difficulty urinating, dysuria, hematuria, vaginal bleeding and vaginal discharge.   Skin: Negative for rash.   Neurological: Negative for dizziness, seizures, light-headedness and headaches.   Hematological: Does not bruise/bleed easily.   Psychiatric/Behavioral: Negative for dysphoric mood. The patient is not nervous/anxious.        Physical Exam     Initial Vitals   BP Pulse Resp Temp SpO2   20   107/69 103 16 98.5 °F (36.9 °C) 99 %      MAP        --                Physical Exam    Vitals reviewed.  Constitutional: She appears well-developed and well-nourished. She is not diaphoretic. No distress.   HENT:   Head: Normocephalic and atraumatic.   Eyes: Conjunctivae are normal. Right eye exhibits no discharge. Left eye exhibits no discharge.   Neck: Neck supple.   Cardiovascular: Normal rate and regular rhythm.   Pulmonary/Chest: Breath sounds normal. No respiratory distress.   Abdominal: Soft. She exhibits no distension. There is no tenderness. There is no rebound and no guarding.   Gravid, fundus NT   Musculoskeletal: She exhibits no edema.   Neurological: She is alert and oriented to person, place, and time.   Skin: Skin is warm and dry. No rash noted. No erythema.   Psychiatric: She has a normal mood and affect. Her behavior is normal. Judgment and thought content normal.     OB LABOR EXAM:       Method: Sterile vaginal exam per MD.   Vaginal Bleeding: none present.     Dilatation: 3.   Station: -2.   Effacement: 60%.             ED Course   Obtain Fetal nonstress test (NST)  Date/Time: 3/17/2020 9:01 PM  Performed by: Miriam Partida MD  Authorized by: Miriam Partida MD     Nonstress Test:     Variability:  6-25 BPM    Decelerations:  None    Accelerations:  15 bpm    Baseline:  140    Uterine Irritability: No      Contractions:  Not present  Biophysical Profile:     Nonstress Test Interpretation: reactive      Overall Impression:  Reassuring      Labs Reviewed - No data to display       Imaging Results    None          Medical Decision Making:   ED Management:  SVE 3/60/-3, unchanged from clinic.   Will observe for 2 hours and repeat SVE  GBS negative   repeat SVE unchanged, patient comfortable resting in bed with lights off.     Fetal status reassuring. NST reactive.     Will discharge home    ED and labor precautions discussed               Attending Attestation:   Physician Attestation Statement for Resident:  As the supervising MD   Physician  Attestation Statement: I have personally seen and examined this patient.   I agree with the above history. -:   As the supervising MD I agree with the above treatment, course, plan, and disposition.  I was personally present during the critical portions of the procedure(s) performed by the resident and was immediately available in the ED to provide services and assistance as needed during the entire procedure.  I have reviewed and agree with the residents interpretation of the following: rhythm strips.  I have reviewed the following: old records at this facility.                    ED Course as of Mar 19 1920   Tue Mar 17, 2020   2157 I evaluated Ms. De Jesus and discussed the plan with Dr. Partida.  Pt presents at 36w2d with contractions, h/o ptd at 36 weeks.  Cervix 3cm, unchanges from previous clinic visit.  VSS, FEtal status reassuring. Will recheck in 11pm    [HU]      ED Course User Index  [HU] Sunni Marin DO                Clinical Impression:       ICD-10-CM ICD-9-CM   1. Uterine contractions during pregnancy O62.2 661.20   2. 36 weeks gestation of pregnancy Z3A.36 V22.2             ED Disposition Condition    Discharge Stable        ED Prescriptions     None        Follow-up Information    None                                    Sunni Marin DO  03/19/20 1921

## 2020-03-18 NOTE — DISCHARGE INSTRUCTIONS
Labor Precautions    Return if you experience contractions, uterine tightening or cramps(more than 4 in 1 hour for 2 consecutive hours)  Backache that comes and goes  Pelvic pressure  Change in vaginal discharge  Vaginal Bleeding  Leaking of fluid    Call the OB Clinic([phone]) during regular business hours or L&D([phone]) after hours for any questions or concerns  Keep previously scheduled clinic appointment    Drink 8-10 glasses of water a day

## 2020-03-19 ENCOUNTER — ANESTHESIA (OUTPATIENT)
Dept: OBSTETRICS AND GYNECOLOGY | Facility: OTHER | Age: 25
End: 2020-03-19

## 2020-03-19 ENCOUNTER — ANESTHESIA EVENT (OUTPATIENT)
Dept: OBSTETRICS AND GYNECOLOGY | Facility: OTHER | Age: 25
End: 2020-03-19

## 2020-03-19 ENCOUNTER — HOSPITAL ENCOUNTER (INPATIENT)
Facility: OTHER | Age: 25
LOS: 2 days | Discharge: HOME | End: 2020-03-21
Attending: OBSTETRICS & GYNECOLOGY | Admitting: OBSTETRICS & GYNECOLOGY
Payer: MEDICAID

## 2020-03-19 ENCOUNTER — TELEPHONE (OUTPATIENT)
Dept: OBSTETRICS AND GYNECOLOGY | Facility: CLINIC | Age: 25
End: 2020-03-19

## 2020-03-19 DIAGNOSIS — Z3A.36 36 WEEKS GESTATION OF PREGNANCY: ICD-10-CM

## 2020-03-19 DIAGNOSIS — Z87.51 HISTORY OF PRETERM DELIVERY: ICD-10-CM

## 2020-03-19 DIAGNOSIS — O42.919 PRETERM PREMATURE RUPTURE OF MEMBRANES, UNSPECIFIED DURATION TO ONSET OF LABOR: ICD-10-CM

## 2020-03-19 PROBLEM — Z37.9 NORMAL LABOR: Status: ACTIVE | Noted: 2020-03-19

## 2020-03-19 LAB
ABO + RH BLD: NORMAL
BASOPHILS # BLD AUTO: 0.02 K/UL (ref 0–0.2)
BASOPHILS NFR BLD: 0.2 % (ref 0–1.9)
BLD GP AB SCN CELLS X3 SERPL QL: NORMAL
C TRACH DNA SPEC QL NAA+PROBE: NOT DETECTED
DIFFERENTIAL METHOD: ABNORMAL
EOSINOPHIL # BLD AUTO: 0.1 K/UL (ref 0–0.5)
EOSINOPHIL NFR BLD: 1.1 % (ref 0–8)
ERYTHROCYTE [DISTWIDTH] IN BLOOD BY AUTOMATED COUNT: 13.2 % (ref 11.5–14.5)
HCT VFR BLD AUTO: 29.5 % (ref 37–48.5)
HGB BLD-MCNC: 9.6 G/DL (ref 12–16)
IMM GRANULOCYTES # BLD AUTO: 0.05 K/UL (ref 0–0.04)
IMM GRANULOCYTES NFR BLD AUTO: 0.6 % (ref 0–0.5)
LYMPHOCYTES # BLD AUTO: 1.2 K/UL (ref 1–4.8)
LYMPHOCYTES NFR BLD: 13 % (ref 18–48)
MCH RBC QN AUTO: 26.7 PG (ref 27–31)
MCHC RBC AUTO-ENTMCNC: 32.5 G/DL (ref 32–36)
MCV RBC AUTO: 82 FL (ref 82–98)
MONOCYTES # BLD AUTO: 0.7 K/UL (ref 0.3–1)
MONOCYTES NFR BLD: 8 % (ref 4–15)
N GONORRHOEA DNA SPEC QL NAA+PROBE: NOT DETECTED
NEUTROPHILS # BLD AUTO: 6.8 K/UL (ref 1.8–7.7)
NEUTROPHILS NFR BLD: 77.1 % (ref 38–73)
NRBC BLD-RTO: 0 /100 WBC
PLATELET # BLD AUTO: 258 K/UL (ref 150–350)
PMV BLD AUTO: 10 FL (ref 9.2–12.9)
RBC # BLD AUTO: 3.59 M/UL (ref 4–5.4)
WBC # BLD AUTO: 8.86 K/UL (ref 3.9–12.7)

## 2020-03-19 PROCEDURE — 11000001 HC ACUTE MED/SURG PRIVATE ROOM

## 2020-03-19 PROCEDURE — 72100002 HC LABOR CARE, 1ST 8 HOURS

## 2020-03-19 PROCEDURE — 99283 PR EMERGENCY DEPT VISIT,LEVEL III: ICD-10-PCS | Mod: 25,,, | Performed by: OBSTETRICS & GYNECOLOGY

## 2020-03-19 PROCEDURE — 59025 FETAL NON-STRESS TEST: CPT | Mod: 26,,, | Performed by: OBSTETRICS & GYNECOLOGY

## 2020-03-19 PROCEDURE — 85025 COMPLETE CBC W/AUTO DIFF WBC: CPT

## 2020-03-19 PROCEDURE — 99285 EMERGENCY DEPT VISIT HI MDM: CPT | Mod: 25

## 2020-03-19 PROCEDURE — 25000003 PHARM REV CODE 250: Performed by: STUDENT IN AN ORGANIZED HEALTH CARE EDUCATION/TRAINING PROGRAM

## 2020-03-19 PROCEDURE — 59025 FETAL NON-STRESS TEST: CPT

## 2020-03-19 PROCEDURE — 59409 PR OBSTETRICAL CARE,VAG DELIV ONLY: ICD-10-PCS | Mod: AT,,, | Performed by: OBSTETRICS & GYNECOLOGY

## 2020-03-19 PROCEDURE — 72200005 HC VAGINAL DELIVERY LEVEL II

## 2020-03-19 PROCEDURE — 99283 EMERGENCY DEPT VISIT LOW MDM: CPT | Mod: 25,,, | Performed by: OBSTETRICS & GYNECOLOGY

## 2020-03-19 PROCEDURE — 59409 OBSTETRICAL CARE: CPT | Mod: AT,,, | Performed by: OBSTETRICS & GYNECOLOGY

## 2020-03-19 PROCEDURE — 59025 PR FETAL 2N-STRESS TEST: ICD-10-PCS | Mod: 26,,, | Performed by: OBSTETRICS & GYNECOLOGY

## 2020-03-19 PROCEDURE — 63600175 PHARM REV CODE 636 W HCPCS: Performed by: STUDENT IN AN ORGANIZED HEALTH CARE EDUCATION/TRAINING PROGRAM

## 2020-03-19 PROCEDURE — 86850 RBC ANTIBODY SCREEN: CPT

## 2020-03-19 PROCEDURE — 25000003 PHARM REV CODE 250

## 2020-03-19 RX ORDER — LIDOCAINE HYDROCHLORIDE 10 MG/ML
20 INJECTION INFILTRATION; PERINEURAL ONCE
Status: DISCONTINUED | OUTPATIENT
Start: 2020-03-19 | End: 2020-03-21 | Stop reason: HOSPADM

## 2020-03-19 RX ORDER — SODIUM CHLORIDE 9 MG/ML
INJECTION, SOLUTION INTRAVENOUS CONTINUOUS
Status: DISCONTINUED | OUTPATIENT
Start: 2020-03-19 | End: 2020-03-21 | Stop reason: HOSPADM

## 2020-03-19 RX ORDER — OXYTOCIN 10 [USP'U]/ML
10 INJECTION, SOLUTION INTRAMUSCULAR; INTRAVENOUS
Status: DISCONTINUED | OUTPATIENT
Start: 2020-03-19 | End: 2020-03-21 | Stop reason: HOSPADM

## 2020-03-19 RX ORDER — LIDOCAINE HYDROCHLORIDE 10 MG/ML
10 INJECTION INFILTRATION; PERINEURAL ONCE
Status: DISCONTINUED | OUTPATIENT
Start: 2020-03-19 | End: 2020-03-19

## 2020-03-19 RX ORDER — OXYTOCIN/RINGER'S LACTATE 30/500 ML
334 PLASTIC BAG, INJECTION (ML) INTRAVENOUS ONCE
Status: COMPLETED | OUTPATIENT
Start: 2020-03-19 | End: 2020-03-19

## 2020-03-19 RX ORDER — LIDOCAINE HYDROCHLORIDE 10 MG/ML
INJECTION INFILTRATION; PERINEURAL
Status: DISPENSED
Start: 2020-03-19 | End: 2020-03-20

## 2020-03-19 RX ORDER — CEFAZOLIN SODIUM 2 G/50ML
2 SOLUTION INTRAVENOUS ONCE AS NEEDED
Status: DISCONTINUED | OUTPATIENT
Start: 2020-03-19 | End: 2020-03-21 | Stop reason: HOSPADM

## 2020-03-19 RX ORDER — HYDROCODONE BITARTRATE AND ACETAMINOPHEN 5; 325 MG/1; MG/1
1 TABLET ORAL EVERY 4 HOURS PRN
Status: DISCONTINUED | OUTPATIENT
Start: 2020-03-19 | End: 2020-03-21 | Stop reason: HOSPADM

## 2020-03-19 RX ORDER — OXYTOCIN/RINGER'S LACTATE 30/500 ML
95 PLASTIC BAG, INJECTION (ML) INTRAVENOUS ONCE
Status: DISCONTINUED | OUTPATIENT
Start: 2020-03-19 | End: 2020-03-21 | Stop reason: HOSPADM

## 2020-03-19 RX ORDER — HYDROCODONE BITARTRATE AND ACETAMINOPHEN 10; 325 MG/1; MG/1
1 TABLET ORAL EVERY 4 HOURS PRN
Status: DISCONTINUED | OUTPATIENT
Start: 2020-03-19 | End: 2020-03-21 | Stop reason: HOSPADM

## 2020-03-19 RX ORDER — IBUPROFEN 600 MG/1
600 TABLET ORAL EVERY 6 HOURS
Status: DISCONTINUED | OUTPATIENT
Start: 2020-03-19 | End: 2020-03-21 | Stop reason: HOSPADM

## 2020-03-19 RX ORDER — OXYCODONE AND ACETAMINOPHEN 5; 325 MG/1; MG/1
TABLET ORAL
Status: COMPLETED
Start: 2020-03-19 | End: 2020-03-19

## 2020-03-19 RX ADMIN — HYDROCODONE BITARTRATE AND ACETAMINOPHEN 1 TABLET: 10; 325 TABLET ORAL at 10:03

## 2020-03-19 RX ADMIN — Medication 334 MILLI-UNITS/MIN: at 05:03

## 2020-03-19 RX ADMIN — OXYCODONE HYDROCHLORIDE AND ACETAMINOPHEN 2 TABLET: 5; 325 TABLET ORAL at 05:03

## 2020-03-19 RX ADMIN — IBUPROFEN 600 MG: 600 TABLET, FILM COATED ORAL at 11:03

## 2020-03-19 RX ADMIN — SODIUM CHLORIDE, SODIUM LACTATE, POTASSIUM CHLORIDE, AND CALCIUM CHLORIDE 125 ML: .6; .31; .03; .02 INJECTION, SOLUTION INTRAVENOUS at 04:03

## 2020-03-19 RX ADMIN — IBUPROFEN 600 MG: 600 TABLET, FILM COATED ORAL at 05:03

## 2020-03-19 NOTE — ED NOTES
Patient is resting comfortably. Call bell within reach. Family at bedside. Dr pinto and resident notified of admission

## 2020-03-19 NOTE — TELEPHONE ENCOUNTER
----- Message from Akhil Obando sent at 3/19/2020  9:22 AM CDT -----  Contact: pt  Name of Who is Calling: pt    What is the request in detail: calling to inform Dr. Velez her monica broke and she is on her way to [facility]. Please contact to further discuss and advise      Can the clinic reply by [facility]: no    What Number to Call Back if not in [facility]: [phone]

## 2020-03-19 NOTE — TELEPHONE ENCOUNTER
Spoke to patient. Asked for symptoms, patient denied. Patient given OB Hotline number ([phone]) and directed to call if concerns regarding COVID 19 arise.

## 2020-03-19 NOTE — L&D DELIVERY NOTE
[facility]  Vaginal Delivery   Operative Note    SUMMARY   Patient found to be complete and +1, set up to push. After approximately 1 hour of maternal effort,   Normal spontaneous vaginal delivery of live infant, was placed on mothers abdomen for skin to skin and bulb suctioning performed.  Infant delivered position OA over intact perineum.  Nuchal cord: No.    Spontaneous delivery of placenta and IV pitocin given noting good uterine tone.  No lacerations noted.  Patient tolerated delivery well. Sponge needle and lap counted correctly x2.    Indications:  (spontaneous vaginal delivery)  Pregnancy complicated by:   Patient Active Problem List   Diagnosis    History of  delivery    Normal pregnancy in third trimester    Threatened  labor    Normal labor     (spontaneous vaginal delivery)    36 weeks gestation of pregnancy     Admitting GA: 36w4d   Delivery Information for Chevy De Jesus    Birth information:  YOB: 2022   Time of birth: 5:03 PM   Sex: male   Head Delivery Date/Time: 3/19/2020  5:03 PM   Delivery type: Vaginal, Spontaneous   Gestational Age: 36w4d    Delivery Providers    Delivering clinician:  Linn Walter MD   Provider Role    MD Beronica Pryor RN Lauren Griffiths, RN             Measurements    Weight:  2608 g  Length:  48.3 cm  Head circumference:  31.8 cm  Chest circumference:  30.5 cm         Apgars    Living status:  Living  Apgars:   1 min.:   5 min.:   10 min.:   15 min.:   20 min.:     Skin color:   1  1       Heart rate:   2  2       Reflex irritability:   2  2       Muscle tone:   2  2       Respiratory effort:   2  2       Total:   9  9       Apgars assigned by:  AGUSTIN BEVERLY         Operative Delivery    Forceps attempted?:  No  Vacuum extractor attempted?:  No         Shoulder Dystocia    Shoulder dystocia present?:  No           Presentation    Presentation:  Vertex  Position:  Occiput  Anterior           Interventions/Resuscitation    Method:  Bulb Suctioning, Deep Suctioning, Tactile Stimulation       Cord    Vessels:  3 vessels  Complications:  None  Delayed Cord Clamping?:  No  Cord Clamped Date/Time:  3/19/2020  5:03 PM  Cord Blood Disposition:  Lab  Gases Sent?:  No  Stem Cell Collection (by MD):  No       Placenta    Placenta delivery date/time:  3/19/2020 1706  Placenta removal:  Spontaneous  Placenta appearance:  Intact  Placenta disposition:  discarded           Labor Events:       labor: Yes     Labor Onset Date/Time: 2020 08:30     Dilation Complete Date/Time: 2020 16:30     Start Pushing Date/Time: 2020 16:30     Rupture Date/Time:              Rupture type:           Fluid Amount:        Fluid Color:        Fluid Odor:        Membrane Status (PeriCalm):        Rupture Date/Time (PeriCalm):        Fluid Amount (PeriCalm):        Fluid Color (PeriCalm):         steroids: None     Antibiotics given for GBS: No     Induction:       Indications for induction:        Augmentation: oxytocin     Indications for augmentation: Prolonged ROM     Labor complications: None     Additional complications:          Cervical ripening:                     Delivery:      Episiotomy: None     Indication for Episiotomy:       Perineal Lacerations: None Repaired:      Periurethral Laceration:   Repaired:     Labial Laceration:   Repaired:     Sulcus Laceration:   Repaired:     Vaginal Laceration:   Repaired:     Cervical Laceration:   Repaired:     Repair suture: None     Repair # of packets:       Last Value - EBL - Nursing (mL):       Sum - EBL - Nursing (mL): 0     Last Value - EBL - Anesthesia (mL):      Calculated QBL (mL):        Vaginal Sweep Performed: Yes     Surgicount Correct: Yes       Other providers:       Anesthesia    Method:  Pudendal          Details (if applicable):  Trial of Labor      Categorization:      Priority:      Indications for :     Incision Type:       Additional  information:  Forceps:    Vacuum:    Breech:    Observed anomalies    Other (Comments):         Yaneth Hawkins M.D.   OB/GYN  PGY-1

## 2020-03-19 NOTE — ANESTHESIA PREPROCEDURE EVALUATION
[facility]  Anesthesia Pre-Operative Evaluation         Patient Name: Dannielle De Jesus  YOB: 1995  MRN: [id number]    2020      Dannielle De Jesus is a 24 y.o. female  @ 36w4d who presents with ROM. Hx of PTD and limited PNC. Denies any sig pmhx. Had epidural with first pregnancy but did not like it, no epidural for her second labor and that is her plan for this labor.     OB History    Para Term  AB Living   4 2 1 1 1 2   SAB TAB Ectopic Multiple Live Births         0 2      # Outcome Date GA Lbr Evgeny/2nd Weight Sex Delivery Anes PTL Lv   4 Current            3 Term 17 37w4d  2.72 kg (5 lb 15.9 oz) M Vag-Spont None N DAJA      Complications: Shoulder Dystocia   2  13 36w0d  2.523 kg (5 lb 9 oz) M Vag-Spont   DAJA   1 AB                Review of patient's allergies indicates:  No Known Allergies    Wt Readings from Last 1 Encounters:   20 1320 69.3 kg (152 lb 14.2 oz)       BP Readings from Last 3 Encounters:   20 108/61   20 110/64   20 108/70       Patient Active Problem List   Diagnosis    History of  delivery    Normal pregnancy in third trimester    Threatened  labor    Normal labor    36 weeks gestation of pregnancy       No past surgical history on file.    Social History     Socioeconomic History    Marital status: Single     Spouse name: Not on file    Number of children: Not on file    Years of education: Not on file    Highest education level: Not on file   Occupational History    Not on file   Social Needs    Financial resource strain: Not on file    Food insecurity:     Worry: Not on file     Inability: Not on file    Transportation needs:     Medical: Not on file     Non-medical: Not on file   Tobacco Use    Smoking status: Never Smoker    Smokeless tobacco: Never Used   Substance and Sexual Activity    Alcohol use: Not Currently     Comment: social use    Drug use: No     Sexual activity: Yes     Partners: Male     Birth control/protection: None   Lifestyle    Physical activity:     Days per week: Not on file     Minutes per session: Not on file    Stress: Not on file   Relationships    Social connections:     Talks on phone: Not on file     Gets together: Not on file     Attends Church service: Not on file     Active member of club or organization: Not on file     Attends meetings of clubs or organizations: Not on file     Relationship status: Not on file   Other Topics Concern    Not on file   Social History Narrative    Not on file         Chemistry        Component Value Date/Time     01/15/2020 1015    K 3.6 01/15/2020 1015     01/15/2020 1015    CO2 22 (L) 01/15/2020 1015    BUN 6 01/15/2020 1015    CREATININE 0.6 01/15/2020 1015    GLU 91 01/15/2020 1015        Component Value Date/Time    CALCIUM 8.8 01/15/2020 1015    ALKPHOS 70 01/15/2020 1015    AST 9 (L) 01/15/2020 1015    ALT 8 (L) 01/15/2020 1015    BILITOT 0.1 01/15/2020 1015    ESTGFRAFRICA >60 01/15/2020 1015    EGFRNONAA >60 01/15/2020 1015            Lab Results   Component Value Date    WBC 9.60 03/12/2020    HGB 9.8 (L) 03/12/2020    HCT 29.0 (L) 03/12/2020    MCV 85 03/12/2020     03/12/2020       No results for input(s): PT, INR, PROTIME, APTT in the last 72 hours.          Anesthesia Evaluation    I have reviewed the Patient Summary Reports.     I have reviewed the Medications.     Review of Systems  EENT/Dental:EENT/Dental Normal   Cardiovascular:  Cardiovascular Normal     Pulmonary:  Pulmonary Normal    Renal/:  Renal/ Normal     Hepatic/GI:  Hepatic/GI Normal    OB/GYN/PEDS:  Planned Vaginal Delivery  Denies Issues with Current Pregnancy  Neuraxial Anesthesia - Previous History of no problems with epidural    Neurological:  Neurology Normal    Endocrine:  Endocrine Normal        Physical Exam  General:  Well nourished    Airway/Jaw/Neck:  Airway Findings: Mouth Opening:  Normal Tongue: Normal  Mallampati: II     Eyes/Ears/Nose:  EYES/EARS/NOSE FINDINGS: Normal   Dental:  DENTAL FINDINGS: Normal   Chest/Lungs:  Chest/Lungs Clear    Heart/Vascular:  Heart Findings: Normal Heart murmur: negative       Mental Status:  Mental Status Findings: Normal        Anesthesia Plan  Type of Anesthesia, risks & benefits discussed:  Anesthesia Type:  epidural  Patient's Preference:   Intra-op Monitoring Plan: standard ASA monitors  Intra-op Monitoring Plan Comments:   Post Op Pain Control Plan: per primary service following discharge from PACU  Post Op Pain Control Plan Comments:   Induction:   IV  Beta Blocker:  Patient is not currently on a Beta-Blocker (No further documentation required).       Informed Consent: Patient understands risks and agrees with Anesthesia plan.  Questions answered. Anesthesia consent signed with patient.  ASA Score: 2     Day of Surgery Review of History & Physical:    H&P update referred to the surgeon.         Ready For Surgery From Anesthesia Perspective.

## 2020-03-19 NOTE — H&P
HISTORY AND PHYSICAL                                                OBSTETRICS          Subjective:      Dannielle De Jesus is a 24 y.o. [id number] at 36w4d presents complaining of rupture of membranes at 0830 w/ clear fluid. Denies any contractions.   This IUP is complicated by history of  delivery and limited prenatal care.  Patient denies contractions, denies vaginal bleeding, reports LOF.   Fetal Movement: normal.    Review of Systems   Constitutional: Negative for chills and fever.   Eyes: Negative for visual disturbance.   Respiratory: Negative for shortness of breath.    Cardiovascular: Negative for chest pain and palpitations.   Gastrointestinal: Negative for abdominal pain, constipation, diarrhea, nausea and vomiting.   Genitourinary: Positive for vaginal discharge. Negative for dysuria and vaginal bleeding.   Musculoskeletal: Negative for back pain.   Integumentary:  Negative for rash.   Neurological: Negative for seizures, syncope and headaches.   Hematological: Does not bruise/bleed easily.   Psychiatric/Behavioral: Negative for depression. The patient is not nervous/anxious.        PMHx: History reviewed. No pertinent past medical history.    PSHx: No past surgical history on file.    All: Review of patient's allergies indicates:  No Known Allergies    Meds:   (Not in a hospital admission)    SH:   Social History     Socioeconomic History    Marital status: Single     Spouse name: Not on file    Number of children: Not on file    Years of education: Not on file    Highest education level: Not on file   Occupational History    Not on file   Social Needs    Financial resource strain: Not on file    Food insecurity:     Worry: Not on file     Inability: Not on file    Transportation needs:     Medical: Not on file     Non-medical: Not on file   Tobacco Use    Smoking status: Never Smoker    Smokeless tobacco: Never Used   Substance and Sexual Activity    Alcohol use: Not Currently      "Comment: social use    Drug use: No    Sexual activity: Yes     Partners: Male     Birth control/protection: None   Lifestyle    Physical activity:     Days per week: Not on file     Minutes per session: Not on file    Stress: Not on file   Relationships    Social connections:     Talks on phone: Not on file     Gets together: Not on file     Attends L[employer] service: Not on file     Active member of club or organization: Not on file     Attends meetings of clubs or organizations: Not on file     Relationship status: Not on file   Other Topics Concern    Not on file   Social History Narrative    Not on file       FH:   Family History   Problem Relation Age of Onset    Cancer Neg Hx      labor Neg Hx     Diabetes Neg Hx     Breast cancer Neg Hx     Colon cancer Neg Hx     Ovarian cancer Neg Hx        OBHx:   OB History    Para Term  AB Living   4 2 1 1 1 2   SAB TAB Ectopic Multiple Live Births   0 0 0 0 2      # Outcome Date GA Lbr Evgeny/2nd Weight Sex Delivery Anes PTL Lv   4 Current            3 Term 17 37w4d  2.72 kg (5 lb 15.9 oz) M Vag-Spont None N DAJA      Complications: Shoulder Dystocia      Name: AJ COFFMAN      Apgar1: 9  Apgar5: 9   2  13 36w0d  2.523 kg (5 lb 9 oz) M Vag-Spont   DAJA   1 AB                Objective:       /61   Pulse 97   Temp 98.1 °F (36.7 °C) (Oral)   Resp 18   Ht 5' 3" (1.6 m)   Wt 69.3 kg (152 lb 14.2 oz)   LMP 2019 (Exact Date)   Breastfeeding? No   BMI 27.08 kg/m²     Vitals:    20 1320   BP: 108/61   Pulse: 97   Resp: 18   Temp: 98.1 °F (36.7 °C)   TempSrc: Oral   Weight: 69.3 kg (152 lb 14.2 oz)   Height: 5' 3" (1.6 m)       General:   alert, appears stated age and cooperative, no apparent distress   HENT:  normocephalic, atraumatic   Eyes:  extraocular movements and conjunctivae normal   Neck:  supple, range of motion normal, no thyromegaly   Lungs:   no respiratory distress   Heart:   regular " rate   Abdomen:  soft, non-tender, non-distended but gravid, no rebound or guarding    Extremities negative edema, negative erythema   FHT: 130 bpm, moderate BTBV, +accels, -decels;  Cat 1 (reassuring)                 TOCO: Irregularly   Presentations: cephalic by ultrasound   Cervix:     Dilation: 5 cm    Effacement: 70%    Station:  -2     Sterile Speculum Exam: Grossly ruptured, clear fluid    Lab Review  Blood Type O POS  GBBS: negative  Rubella: Immune  RPR: Nonreactive  HIV: negative  HepB: negative       Assessment:       36w4d weeks gestation with:    Active Hospital Problems    Diagnosis  POA    *Normal labor [O80, Z37.9]  Not Applicable    36 weeks gestation of pregnancy [Z3A.36]  Not Applicable    History of  delivery [Z87.51]  Not Applicable      Resolved Hospital Problems   No resolved problems to display.          Plan:     1. Normal labor  - Risks, benefits, alternatives and possible complications have been discussed in detail with the patient.   - Consents signed and to chart  - Admit to Labor and Delivery unit  - Will hold pitocin at this time. Counseled patient that if she is unchanged in 2 hours, would recommend starting pitocin.   - Epidural per Anesthesia  - Draw CBC, T&S  - Notify Staff  - Recheck in 2 hrs or PRN      2. History of PTD  - Delivered at 36 weeks in 1 first pregnancy  - Has been on robin in this pregnancy      Hilaria Falk MD  OBGYN PGY-2

## 2020-03-19 NOTE — ED PROVIDER NOTES
Encounter Date: 3/19/2020       History     Chief Complaint   Patient presents with    Vaginal Discharge     Dannielle De Jesus is a 24 y.o. [id number] at 36w4d presents complaining of rupture of membranes at 0830 w/ clear fluid. Denies any contractions.   This IUP is complicated by history of  delivery and limited prenatal care.  Patient denies contractions, denies vaginal bleeding, reports LOF.   Fetal Movement: normal.        Review of patient's allergies indicates:  No Known Allergies  History reviewed. No pertinent past medical history.  No past surgical history on file.  Family History   Problem Relation Age of Onset    Cancer Neg Hx      labor Neg Hx     Diabetes Neg Hx     Breast cancer Neg Hx     Colon cancer Neg Hx     Ovarian cancer Neg Hx      Social History     Tobacco Use    Smoking status: Never Smoker    Smokeless tobacco: Never Used   Substance Use Topics    Alcohol use: Not Currently     Comment: social use    Drug use: No     Review of Systems   Constitutional: Negative for activity change, appetite change, chills and fever.   Eyes: Negative for photophobia and visual disturbance.   Respiratory: Negative for chest tightness and shortness of breath.    Cardiovascular: Negative for chest pain.   Gastrointestinal: Negative for abdominal pain, constipation, diarrhea, nausea and vomiting.   Genitourinary: Positive for vaginal discharge. Negative for dysuria, pelvic pain and vaginal bleeding.   Musculoskeletal: Negative for back pain.   Skin: Negative for rash.   Neurological: Negative for dizziness, light-headedness and headaches.   Psychiatric/Behavioral: Negative for dysphoric mood, self-injury and suicidal ideas.       Physical Exam     Initial Vitals [20 1320]   BP Pulse Resp Temp SpO2   108/61 97 18 98.1 °F (36.7 °C) --      MAP       --         Physical Exam    Vitals reviewed.  Constitutional: She appears well-developed and well-nourished. She is not diaphoretic. No  distress.   HENT:   Head: Normocephalic and atraumatic.   Nose: Nose normal.   Eyes: Conjunctivae are normal.   Neck: Normal range of motion.   Cardiovascular: Normal rate.   Pulmonary/Chest: No respiratory distress.   Abdominal: Soft. She exhibits no distension. There is no tenderness. There is no rebound and no guarding.   Gravid   Genitourinary: Uterus normal.   Musculoskeletal: She exhibits no edema or tenderness.   Neurological: She is alert and oriented to person, place, and time.   Skin: Skin is warm and dry.   Psychiatric: She has a normal mood and affect. Her behavior is normal. Judgment and thought content normal.     OB LABOR EXAM:   Pre-Term Labor: Yes.   Membranes ruptured: Yes.   Method: Sterile vaginal exam per MD and Sterile speculum exam per MD.   Vaginal Bleeding: none present.     Dilatation: 5.   Station: -3.   Effacement: 70%.   Amniotic Fluid Color: clear.   Amniotic Fluid Amount: copious.   Comments: Grossly ruptured       ED Course   Obtain Fetal nonstress test (NST)  Date/Time: 3/19/2020 1:32 PM  Performed by: Taz Taylor MD  Authorized by: Linn Walter MD     Nonstress Test:     Variability:  6-25 BPM    Decelerations:  None    Accelerations:  15 bpm    Baseline:  145    Uterine Irritability: Yes      Contractions:  Irregular  Biophysical Profile:     Nonstress Test Interpretation: reactive      Overall Impression:  Reassuring      Labs Reviewed - No data to display       Imaging Results    None          Medical Decision Making:   ED Management:  VSS. NST reactive and reassuring. Grossly ruptured. Cervix 5/70/-2. Will admit to L&D.              Attending Attestation:   Physician Attestation Statement for Resident:  As the supervising MD   Physician Attestation Statement: I have personally seen and examined this patient.   I agree with the above history. -:   As the supervising MD I agree with the above PE.    As the supervising MD I agree with the above treatment, course, plan,  and disposition.   -: Patient evaluated and found to be stable, agree with resident's assessment and plan.  I was personally present during the critical portions of the procedure(s) performed by the resident and was immediately available in the ED to provide services and assistance as needed during the entire procedure.  I have reviewed the following: old records at this facility.                    ED Course as of Mar 19 1348   Thu Mar 19, 2020   1339 NST reactive/reassuring. SROM noted, 5 cm. Admit to labor and delivery.     [AR]      ED Course User Index  [AR] Linn Walter MD                Clinical Impression:       ICD-10-CM ICD-9-CM   1. 36 weeks gestation of pregnancy Z3A.36 V22.2   2.  premature rupture of membranes, unspecified duration to onset of labor O42.919 658.10   3. History of  delivery Z87.51 V13.21         Disposition:   Disposition: Admitted  Condition: Stable                        Hilaria Falk MD  Resident  20 1350       Linn Walter MD  20 1419

## 2020-03-20 VITALS
OXYGEN SATURATION: 97 % | BODY MASS INDEX: 27.09 KG/M2 | HEIGHT: 63 IN | RESPIRATION RATE: 17 BRPM | SYSTOLIC BLOOD PRESSURE: 102 MMHG | HEART RATE: 81 BPM | DIASTOLIC BLOOD PRESSURE: 63 MMHG | WEIGHT: 152.88 LBS | TEMPERATURE: 98 F

## 2020-03-20 LAB
BASOPHILS # BLD AUTO: 0.03 K/UL (ref 0–0.2)
BASOPHILS NFR BLD: 0.2 % (ref 0–1.9)
DIFFERENTIAL METHOD: ABNORMAL
EOSINOPHIL # BLD AUTO: 0.2 K/UL (ref 0–0.5)
EOSINOPHIL NFR BLD: 1.8 % (ref 0–8)
ERYTHROCYTE [DISTWIDTH] IN BLOOD BY AUTOMATED COUNT: 13.1 % (ref 11.5–14.5)
HCT VFR BLD AUTO: 27.7 % (ref 37–48.5)
HGB BLD-MCNC: 8.9 G/DL (ref 12–16)
IMM GRANULOCYTES # BLD AUTO: 0.06 K/UL (ref 0–0.04)
IMM GRANULOCYTES NFR BLD AUTO: 0.5 % (ref 0–0.5)
LYMPHOCYTES # BLD AUTO: 2 K/UL (ref 1–4.8)
LYMPHOCYTES NFR BLD: 16.6 % (ref 18–48)
MCH RBC QN AUTO: 27.3 PG (ref 27–31)
MCHC RBC AUTO-ENTMCNC: 32.1 G/DL (ref 32–36)
MCV RBC AUTO: 85 FL (ref 82–98)
MONOCYTES # BLD AUTO: 0.9 K/UL (ref 0.3–1)
MONOCYTES NFR BLD: 7.5 % (ref 4–15)
NEUTROPHILS # BLD AUTO: 9 K/UL (ref 1.8–7.7)
NEUTROPHILS NFR BLD: 73.4 % (ref 38–73)
NRBC BLD-RTO: 0 /100 WBC
PLATELET # BLD AUTO: 234 K/UL (ref 150–350)
PMV BLD AUTO: 9.9 FL (ref 9.2–12.9)
RBC # BLD AUTO: 3.26 M/UL (ref 4–5.4)
WBC # BLD AUTO: 12.28 K/UL (ref 3.9–12.7)

## 2020-03-20 PROCEDURE — 85025 COMPLETE CBC W/AUTO DIFF WBC: CPT

## 2020-03-20 PROCEDURE — 25000003 PHARM REV CODE 250: Performed by: STUDENT IN AN ORGANIZED HEALTH CARE EDUCATION/TRAINING PROGRAM

## 2020-03-20 PROCEDURE — 11000001 HC ACUTE MED/SURG PRIVATE ROOM

## 2020-03-20 PROCEDURE — 99231 SBSQ HOSP IP/OBS SF/LOW 25: CPT | Mod: ,,, | Performed by: OBSTETRICS & GYNECOLOGY

## 2020-03-20 PROCEDURE — 99231 PR SUBSEQUENT HOSPITAL CARE,LEVL I: ICD-10-PCS | Mod: ,,, | Performed by: OBSTETRICS & GYNECOLOGY

## 2020-03-20 PROCEDURE — 36415 COLL VENOUS BLD VENIPUNCTURE: CPT

## 2020-03-20 RX ORDER — DIPHENOXYLATE HYDROCHLORIDE AND ATROPINE SULFATE 2.5; .025 MG/1; MG/1
1 TABLET ORAL 4 TIMES DAILY PRN
Status: DISCONTINUED | OUTPATIENT
Start: 2020-03-20 | End: 2020-03-21 | Stop reason: HOSPADM

## 2020-03-20 RX ORDER — SIMETHICONE 80 MG
1 TABLET,CHEWABLE ORAL 4 TIMES DAILY PRN
Status: DISCONTINUED | OUTPATIENT
Start: 2020-03-20 | End: 2020-03-20

## 2020-03-20 RX ORDER — PRENATAL WITH FERROUS FUM AND FOLIC ACID 3080; 920; 120; 400; 22; 1.84; 3; 20; 10; 1; 12; 200; 27; 25; 2 [IU]/1; [IU]/1; MG/1; [IU]/1; MG/1; MG/1; MG/1; MG/1; MG/1; MG/1; UG/1; MG/1; MG/1; MG/1; MG/1
1 TABLET ORAL DAILY
Status: DISCONTINUED | OUTPATIENT
Start: 2020-03-21 | End: 2020-03-21 | Stop reason: HOSPADM

## 2020-03-20 RX ORDER — DOCUSATE SODIUM 100 MG/1
200 CAPSULE, LIQUID FILLED ORAL 2 TIMES DAILY PRN
Status: DISCONTINUED | OUTPATIENT
Start: 2020-03-20 | End: 2020-03-20

## 2020-03-20 RX ORDER — HYDROCORTISONE 25 MG/G
CREAM TOPICAL 3 TIMES DAILY PRN
Status: DISCONTINUED | OUTPATIENT
Start: 2020-03-20 | End: 2020-03-21 | Stop reason: HOSPADM

## 2020-03-20 RX ORDER — ONDANSETRON 8 MG/1
8 TABLET, ORALLY DISINTEGRATING ORAL EVERY 8 HOURS PRN
Status: DISCONTINUED | OUTPATIENT
Start: 2020-03-20 | End: 2020-03-21 | Stop reason: HOSPADM

## 2020-03-20 RX ORDER — SIMETHICONE 80 MG
1 TABLET,CHEWABLE ORAL EVERY 6 HOURS PRN
Status: DISCONTINUED | OUTPATIENT
Start: 2020-03-20 | End: 2020-03-20

## 2020-03-20 RX ORDER — DOCUSATE SODIUM 100 MG/1
200 CAPSULE, LIQUID FILLED ORAL 2 TIMES DAILY PRN
Status: DISCONTINUED | OUTPATIENT
Start: 2020-03-20 | End: 2020-03-21 | Stop reason: HOSPADM

## 2020-03-20 RX ORDER — CARBOPROST TROMETHAMINE 250 UG/ML
250 INJECTION, SOLUTION INTRAMUSCULAR
Status: DISCONTINUED | OUTPATIENT
Start: 2020-03-20 | End: 2020-03-21 | Stop reason: HOSPADM

## 2020-03-20 RX ORDER — IRON POLYSACCHARIDE COMPLEX 150 MG
150 CAPSULE ORAL DAILY
Status: DISCONTINUED | OUTPATIENT
Start: 2020-03-20 | End: 2020-03-21 | Stop reason: HOSPADM

## 2020-03-20 RX ORDER — SODIUM CHLORIDE 9 MG/ML
INJECTION, SOLUTION INTRAVENOUS
Status: DISCONTINUED | OUTPATIENT
Start: 2020-03-20 | End: 2020-03-21 | Stop reason: HOSPADM

## 2020-03-20 RX ORDER — ONDANSETRON 8 MG/1
8 TABLET, ORALLY DISINTEGRATING ORAL EVERY 8 HOURS PRN
Status: DISCONTINUED | OUTPATIENT
Start: 2020-03-20 | End: 2020-03-20

## 2020-03-20 RX ORDER — CALCIUM CARBONATE 200(500)MG
500 TABLET,CHEWABLE ORAL 3 TIMES DAILY PRN
Status: DISCONTINUED | OUTPATIENT
Start: 2020-03-20 | End: 2020-03-21 | Stop reason: HOSPADM

## 2020-03-20 RX ORDER — OXYTOCIN/RINGER'S LACTATE 30/500 ML
95 PLASTIC BAG, INJECTION (ML) INTRAVENOUS ONCE
Status: DISCONTINUED | OUTPATIENT
Start: 2020-03-20 | End: 2020-03-21 | Stop reason: HOSPADM

## 2020-03-20 RX ORDER — ACETAMINOPHEN 325 MG/1
650 TABLET ORAL EVERY 6 HOURS PRN
Status: DISCONTINUED | OUTPATIENT
Start: 2020-03-20 | End: 2020-03-21 | Stop reason: HOSPADM

## 2020-03-20 RX ORDER — DIPHENHYDRAMINE HCL 25 MG
25 CAPSULE ORAL EVERY 4 HOURS PRN
Status: DISCONTINUED | OUTPATIENT
Start: 2020-03-20 | End: 2020-03-21 | Stop reason: HOSPADM

## 2020-03-20 RX ORDER — DIPHENHYDRAMINE HYDROCHLORIDE 50 MG/ML
25 INJECTION INTRAMUSCULAR; INTRAVENOUS EVERY 4 HOURS PRN
Status: DISCONTINUED | OUTPATIENT
Start: 2020-03-20 | End: 2020-03-21 | Stop reason: HOSPADM

## 2020-03-20 RX ORDER — SIMETHICONE 80 MG
1 TABLET,CHEWABLE ORAL EVERY 6 HOURS PRN
Status: DISCONTINUED | OUTPATIENT
Start: 2020-03-20 | End: 2020-03-21 | Stop reason: HOSPADM

## 2020-03-20 RX ORDER — METHYLERGONOVINE MALEATE 0.2 MG/ML
200 INJECTION INTRAVENOUS
Status: DISCONTINUED | OUTPATIENT
Start: 2020-03-20 | End: 2020-03-21 | Stop reason: HOSPADM

## 2020-03-20 RX ORDER — OXYTOCIN/RINGER'S LACTATE 30/500 ML
2 PLASTIC BAG, INJECTION (ML) INTRAVENOUS CONTINUOUS
Status: DISCONTINUED | OUTPATIENT
Start: 2020-03-20 | End: 2020-03-21 | Stop reason: HOSPADM

## 2020-03-20 RX ORDER — MISOPROSTOL 200 UG/1
800 TABLET ORAL
Status: DISCONTINUED | OUTPATIENT
Start: 2020-03-20 | End: 2020-03-21 | Stop reason: HOSPADM

## 2020-03-20 RX ORDER — IBUPROFEN 600 MG/1
600 TABLET ORAL EVERY 6 HOURS PRN
Qty: 30 TABLET | Refills: 1 | Status: SHIPPED | OUTPATIENT
Start: 2020-03-20

## 2020-03-20 RX ADMIN — HYDROCODONE BITARTRATE AND ACETAMINOPHEN 1 TABLET: 10; 325 TABLET ORAL at 04:03

## 2020-03-20 RX ADMIN — DOCUSATE SODIUM 200 MG: 100 CAPSULE, LIQUID FILLED ORAL at 09:03

## 2020-03-20 RX ADMIN — IBUPROFEN 600 MG: 600 TABLET, FILM COATED ORAL at 06:03

## 2020-03-20 RX ADMIN — IBUPROFEN 600 MG: 600 TABLET, FILM COATED ORAL at 11:03

## 2020-03-20 RX ADMIN — Medication 150 MG: at 09:03

## 2020-03-20 RX ADMIN — HYDROCODONE BITARTRATE AND ACETAMINOPHEN 1 TABLET: 10; 325 TABLET ORAL at 03:03

## 2020-03-20 RX ADMIN — HYDROCODONE BITARTRATE AND ACETAMINOPHEN 1 TABLET: 10; 325 TABLET ORAL at 12:03

## 2020-03-20 RX ADMIN — DIPHENHYDRAMINE HYDROCHLORIDE 25 MG: 25 CAPSULE ORAL at 11:03

## 2020-03-20 RX ADMIN — SIMETHICONE CHEW TAB 80 MG 80 MG: 80 TABLET ORAL at 09:03

## 2020-03-20 RX ADMIN — HYDROCODONE BITARTRATE AND ACETAMINOPHEN 1 TABLET: 10; 325 TABLET ORAL at 08:03

## 2020-03-20 RX ADMIN — IBUPROFEN 600 MG: 600 TABLET, FILM COATED ORAL at 12:03

## 2020-03-20 NOTE — DISCHARGE SUMMARY
Delivery Discharge Summary  Obstetrics      Primary OB Clinician: JAIME Velez MD      Admission date: 3/19/2020  Discharge date: 2020     Disposition: To home, self care    Discharge Diagnosis List:      Patient Active Problem List   Diagnosis    History of  delivery    Normal pregnancy in third trimester    Threatened  labor    Normal labor     (spontaneous vaginal delivery)    36 weeks gestation of pregnancy       Procedure:     Hospital Course:  Dannielle De Jesus is a 24 y.o. now , PPD #2 who was admitted on 3/19/2020 at 36w4d for labor. Patient was subsequently admitted to labor and delivery unit with signed consents.     Labor course was uncomplicated and resulted in  without complications.     Please see delivery note for further details. Her postpartum course was uncomplicated. On discharge day, patient's pain is controlled with oral pain medications. Pt is tolerating ambulation without SOB or CP, and regular diet without N/V. Reports lochia is mild. Denies any HA, vision changes, F/C, LE swelling. Denies any breast pain/soreness.    Pt in stable condition and ready for discharge. She has been instructed to start and/or continue medications and follow up with her obstetrics provider as listed below.    Pertinent studies:  CBC  Recent Labs   Lab 20  1413   WBC 8.86   HGB 9.6*   HCT 29.5*   MCV 82             Immunization History   Administered Date(s) Administered    HPV 9-Valent 2017    Influenza - Quadrivalent - PF (6 months and older) 10/25/2016, 2019    Tdap 03/15/2017, 2020        Delivery:    Episiotomy: None   Lacerations: None   Repair suture: None   Repair # of packets:     Blood loss (ml):       Birth information:  YOB: 2022   Time of birth: 5:03 PM   Sex: male   Delivery type: Vaginal, Spontaneous   Gestational Age: 36w4d    Delivery Clinician:      Other providers:       Additional   information:  Forceps:    Vacuum:    Breech:    Observed anomalies      Living?:           APGARS  One minute Five minutes Ten minutes   Skin color:         Heart rate:         Grimace:         Muscle tone:         Breathing:         Totals: 9  9        Placenta: Delivered:       appearance      Patient Instructions:   Current Discharge Medication List      START taking these medications    Details   ibuprofen (ADVIL,MOTRIN) 600 MG tablet Take 1 tablet (600 mg total) by mouth every 6 (six) hours as needed for Pain.  Qty: 30 tablet, Refills: 1         CONTINUE these medications which have NOT CHANGED    Details   HYDROcodone-acetaminophen (NORCO) 5-325 mg per tablet Take 1 tablet by mouth every 4 (four) hours as needed for Pain.  Qty: 12 tablet, Refills: 0      !! HYDROXYprogesterone (LAUREEN) 250 mg/mL (1 mL) Oil Inject 1 mL (250 mg total) into the muscle every 7 days.  Qty: 18 mL, Refills: 0    Associated Diagnoses: History of  delivery, currently pregnant      !! HYDROXYprogesterone (LAUREEN) 250 mg/mL (1 mL) Oil Inject 1 mL (250 mg total) into the muscle every 7 days.  Qty: 18 mL, Refills: 0    Associated Diagnoses: History of  delivery, currently pregnant      ondansetron (ZOFRAN-ODT) 4 MG TbDL Take 1 tablet (4 mg total) by mouth every 6 (six) hours as needed (Nausea).  Qty: 20 tablet, Refills: 1       !! - Potential duplicate medications found. Please discuss with provider.          Discharge Procedure Orders   Diet Adult Regular     Other restrictions (specify):   Order Comments: Pelvic rest- nothing in the vagina for 6 weeks (no sex, douching, tampons, etc).   No driving until not taking narcotics, or until you feel as though you can safely slam on the brakes without pain  No baths for 6 weeks, only showers     Notify your health care provider if you experience any of the following:  temperature >100.4     Notify your health care provider if you experience any of the following:  persistent nausea  and vomiting or diarrhea     Notify your health care provider if you experience any of the following:  severe uncontrolled pain     Notify your health care provider if you experience any of the following:  redness, tenderness, or signs of infection (pain, swelling, redness, odor or green/yellow discharge around incision site)     Notify your health care provider if you experience any of the following:  difficulty breathing or increased cough     Notify your health care provider if you experience any of the following:  severe persistent headache     Notify your health care provider if you experience any of the following:  worsening rash     Notify your health care provider if you experience any of the following:  persistent dizziness, light-headedness, or visual disturbances     Notify your health care provider if you experience any of the following:  increased confusion or weakness     Notify your health care provider if you experience any of the following:   Order Comments: Heavy vaginal bleeding saturating more than 1 pad per hour for at least 2 hours.     Activity as tolerated       Follow-up Information     B Jaziel Velez MD In 6 weeks.    Specialty:  Obstetrics and Gynecology  Why:  Post-partum visit  Contact information:  [address]  [address] [address] [phone]                    Yaneth Hawkins M.D.   OB/GYN  PGY-1

## 2020-03-20 NOTE — PROGRESS NOTES
Pt pain better controlled over night with norco and motrin. Patient voiding appropriately. Ambulating in room. Bleeding is moderate. Fundus firm and midline. Breastfeeding infant. No acute changes over shift.

## 2020-03-20 NOTE — PROGRESS NOTES
POSTPARTUM PROGRESS NOTE     Dannielle De Jesus is a 24 y.o. female PPD #1 status post Spontaneous vaginal delivery at 36w4d in a pregnancy complicated by h.o PTD and limited PNC. Patient is doing well this morning. She denies nausea, vomiting, fever or chills.  Patient reports mild abdominal pain that is adequately relieved by oral pain medications. Lochia is mild to moderate  and stable. Patient is voiding without difficulty and ambulating with no difficulty. She has passed flatus.  Patient does plan to breast feed. Desires nexplanon @PP visit for contraception. She desires circumcision.     Objective:       Temp:  [97.4 °F (36.3 °C)-98.1 °F (36.7 °C)] 97.7 °F (36.5 °C)  Pulse:  [] 88  Resp:  [18-22] 18  SpO2:  [96 %-100 %] 96 %  BP: (105-127)/(55-88) 127/72    General:   alert, appears stated age and cooperative   Lungs:   Non-labored respirations    Heart:   regular rate and rhythm   Abdomen:  Soft, nondistended    Uterus:  firm located at the umblicus.    Extremities: no pedal edema noted     Lab Review  Recent Labs   Lab 20  1413   WBC 8.86   HGB 9.6*   HCT 29.5*   MCV 82           No results for input(s): NA, K, CL, CO2, BUN, CREATININE, GLU, PROT, BILITOT, ALKPHOS, ALT, AST, MG, PHOS in the last 168 hours.       I/O    Intake/Output Summary (Last 24 hours) at 3/20/2020 0726  Last data filed at 3/20/2020 0600  Gross per 24 hour   Intake --   Output 1000 ml   Net -1000 ml        Assessment:     Patient Active Problem List   Diagnosis    History of  delivery    Normal pregnancy in third trimester    Threatened  labor    Normal labor     (spontaneous vaginal delivery)    36 weeks gestation of pregnancy        Plan:   1. Postpartum care:  - Patient doing well. Continue routine management and advances.  - Continue PO pain meds. Pain well controlled.  - Heme: H/h 9.6/29.5 >>> P  - Encourage ambulation  - Circumcision desired- consents signed and to chart  - Contraception -  nexplanon at 6 week pp visit  - Lactation consult PRN    2. Anemia  - HH as above  - VSS  - Asymtomatic  - PO Fe/Colace      Dispo: As patient meets milestones, will plan to discharge PPD1-2.    Yaneth Hawkins M.D.   OB/GYN  PGY-1

## 2020-03-20 NOTE — PLAN OF CARE
Pt in no apparent distress. VSS. Ambulating and voiding without difficulty. Breastfeeding with some assistance and expressing breast milk. Pain well controlled with current pain regimen. No acute events this shift. No additional needs this time.

## 2020-03-20 NOTE — PLAN OF CARE
Based on pt's stated feeding goals, the Plan of care for today is for pt to do skin-to-skin, to feed frequently on demand but at least q3 hours d/t gestation age, to observe for signs of effective milk transfer, to monitor voids and stools, and to call for assistance. Pt may breastfeed and/ or express breastmilk with hand expression, and provide EBM to baby with cup, or spoon, as needed. Pt verbalizes understanding.

## 2020-03-20 NOTE — DISCHARGE INSTRUCTIONS
Breastfeeding Discharge Instructions       Feed the baby at the earliest sign of hunger or comfort  o Hands to mouth, sucking motions  o Rooting or searching for something to suck on  o Dont wait for crying - it is a sign of distress     The feedings may be 8-12 times per 24hrs and will not follow a schedule   Avoid pacifiers and bottles for the first 4 weeks   Alternate the breast you start the feeding with, or start with the breast that feels the fullest   Switch breasts when the baby takes himself off the breast or falls asleep   Keep offering breasts until the baby looks full, no longer gives hunger signs, and stays asleep when placed on his back in the crib   If the baby is sleepy and wont wake for a feeding, put the baby skin-to-skin dressed in a diaper against the mothers bare chest   Sleep near your baby   The baby should be positioned and latched on to the breast correctly  o Chest-to-chest, chin in the breast  o Babys lips are flipped outward  o Babys mouth is stretched open wide like a shout  o Babys sucking should feel like tugging to the mother  - The baby should be drinking at the breast:  o You should hear swallowing or gulping throughout the feeding  o You should see milk on the babys lips when he comes off the breast  o Your breasts should be softer when the baby is finished feeding  o The baby should look relaxed at the end of feedings  o After the 4th day and your milk is in:  o The babys poop should turn bright yellow and be loose, watery, and seedy  o The baby should have at least 3-4 poops the size of the palm of your hand per day  o The baby should have at least 5-6 wet diapers per day  o The urine should be light yellow in color  You should drink when you are thirsty and eat a healthy diet when you are    hungry.     Take naps to get the rest you need.   Take medications and/or drink alcohol only with permission of your obstetrician    or the babys pediatrician.  You can  also call the [facility] Risk Center,   ([phone]), Monday-Friday, 8am-5pm Central time, to get the most   up-to-date evidence-based information on the use of medications during   pregnancy and breastfeeding.      The baby should be examined by a pediatrician at 3-5 days of age.  Once your   milk comes in, the baby should be gaining at least ½ - 1oz each day and should be back to birthweight no later than 10-14 days of age.          Community Resources    [facility] Breastfeeding Warmline: [phone]   Local [facility] clinics: provide incentives and breastpumps to eligible mothers  [employer] (LLLI):  mother-to-mother support group website        www.[employer].AllofMe  Local La Leche League mother-to-mother support groups:        www.Peonut        La Leche League [facility]   Dr. Chapito Canales website for latch videos and general information:        www.breastfeedinginc.ca  [facility] Risk [facility] is a call center that provides information about the safety of taking medications while breastfeeding.  Call [phone], M-F, 8am-5pm, [place].  International Lactation Consultant Association provides resources for assistance:        www.ilca.org  [place] Breastfeeding Coalition provides informationand resources for parents  and the community    www.LaBreastfeedingSupport.org     Jennifer Telles is a mom-to-mom support group:                             www.[employer].com//breastfeedng-support/  [employer] for Healthy Babies:  3-497-111-BABY(6516)  [employer]: a breastfeeding support group for women of color, [phone]

## 2020-03-20 NOTE — PROGRESS NOTES
Pt crawling in bed crying of cramping pain. Notified MD. Md order to give motrin and follow up. Gave pt motrin and hot packs.

## 2020-03-20 NOTE — LACTATION NOTE
Pt reports infant sleepy, has mostly been hand expressing. Education provided on normal expectations for gestational age, encouraged frequent skin to skin and feeding q 3 hours.     Lactation Basics education completed. LC reviewed Breastfeeding Guide and encouraged tracking feeds and output. Pt verbalized understanding and questions answered. Pt aware to call LC for assistance with feeding.

## 2020-03-21 PROCEDURE — 25000003 PHARM REV CODE 250: Performed by: STUDENT IN AN ORGANIZED HEALTH CARE EDUCATION/TRAINING PROGRAM

## 2020-03-21 PROCEDURE — 99238 HOSP IP/OBS DSCHRG MGMT 30/<: CPT | Mod: ,,, | Performed by: OBSTETRICS & GYNECOLOGY

## 2020-03-21 PROCEDURE — 99238 PR HOSPITAL DISCHARGE DAY,<30 MIN: ICD-10-PCS | Mod: ,,, | Performed by: OBSTETRICS & GYNECOLOGY

## 2020-03-21 RX ORDER — HYDROCODONE BITARTRATE AND ACETAMINOPHEN 5; 325 MG/1; MG/1
1 TABLET ORAL EVERY 6 HOURS PRN
Qty: 20 TABLET | Refills: 0 | Status: SHIPPED | OUTPATIENT
Start: 2020-03-21

## 2020-03-21 RX ADMIN — HYDROCODONE BITARTRATE AND ACETAMINOPHEN 1 TABLET: 10; 325 TABLET ORAL at 08:03

## 2020-03-21 RX ADMIN — DOCUSATE SODIUM 200 MG: 100 CAPSULE, LIQUID FILLED ORAL at 08:03

## 2020-03-21 RX ADMIN — IBUPROFEN 600 MG: 600 TABLET, FILM COATED ORAL at 12:03

## 2020-03-21 RX ADMIN — HYDROCODONE BITARTRATE AND ACETAMINOPHEN 1 TABLET: 5; 325 TABLET ORAL at 12:03

## 2020-03-21 RX ADMIN — HYDROCODONE BITARTRATE AND ACETAMINOPHEN 1 TABLET: 10; 325 TABLET ORAL at 04:03

## 2020-03-21 RX ADMIN — IBUPROFEN 600 MG: 600 TABLET, FILM COATED ORAL at 06:03

## 2020-03-21 RX ADMIN — PRENATAL VIT W/ FE FUMARATE-FA TAB 27-0.8 MG 1 TABLET: 27-0.8 TAB at 08:03

## 2020-03-21 RX ADMIN — Medication 150 MG: at 08:03

## 2020-03-21 NOTE — LACTATION NOTE
Pt reports infant latching more eagerly, denies pain. Oral assessment completed per pt request, see note on infant's chart. Infant latches to left breast, wide gape and deep latch achieved, audible swallows which increase with breast compression. LC demonstrated how to uncurl infant top lip. Pt denies pain. Reinforced importance of frequent feeding and educated pt to pump at least 5x/24 hours after feedings to build/maintain milk supply d/t infant's gestational age.     Lactation discharge education completed. Plan of care is for pt to follow basic breastfeeding education, frequent feeding on demand at least q3 hours, and to monitor baby's voids and stools. Breastfeeding guide, including First Alert survey, resource list, and lactation warmline phone number reviewed. Pt to notify doctor for maternal or infant concerns, as reviewed with LC. Pt verbalizes understanding and questions answered.        03/21/20 2389   Maternal Assessment   Breast Shape round   Breast Density soft   Areola elastic   Nipples everted;short   Maternal Infant Feeding   Maternal Emotional State relaxed   Infant Positioning clutch/football   Signs of Milk Transfer audible swallow;infant jaw motion present   Pain with Feeding no   Nipple Shape After Feeding, Left continues nursing   Lactation Referrals   Lactation Referrals outpatient lactation program;support group

## 2020-03-21 NOTE — PROGRESS NOTES
Pt ambulating, voiding, and passing flatus. Pt tolerating PO well and no SS of distress at this time. Pt's pain well controlled throughout shift by oral pain medication. Pt's bleeding has been light throughout shift and fundus is firm. Plan of care reviewed with pt at the beginning of the shift. Pt verbalized understanding; questions answered. Will continue to monitor.

## 2020-03-21 NOTE — NURSING
Patient to be DC to home with infant in arms via wheelchair by transport. No distress noted at this time.

## 2020-03-21 NOTE — PLAN OF CARE
Based on pt's stated feeding goals, the Plan of care for today is for pt to do skin-to-skin, to feed frequently on demand but at least q3 hours d/t gestational age, to observe for signs of effective milk transfer, to monitor voids and stools, and to call for assistance. Pt may breastfeed and/ or express breastmilk with hand expression, and provide EBM to baby with cup, or spoon, as needed. Pt to pump bilaterally x 15 minutes after feedings at least 5x/24 hours and provide supplement to infant. Pt verbalizes understanding.

## 2020-03-21 NOTE — PLAN OF CARE
Problem:  Fall Injury Risk  Goal: Absence of Fall, Infant Drop and Related Injury  Outcome: Ongoing, Progressing     Problem: Adult Inpatient Plan of Care  Goal: Plan of Care Review  Outcome: Ongoing, Progressing  Goal: Patient-Specific Goal (Individualization)  Outcome: Ongoing, Progressing  Goal: Absence of Hospital-Acquired Illness or Injury  Outcome: Ongoing, Progressing  Goal: Optimal Comfort and Wellbeing  Outcome: Ongoing, Progressing  Goal: Readiness for Transition of Care  Outcome: Ongoing, Progressing  Goal: Rounds/Family Conference  Outcome: Ongoing, Progressing     Problem: Breastfeeding  Goal: Effective Breastfeeding  Outcome: Ongoing, Progressing     Problem: Adjustment to Role Transition (Postpartum Vaginal Delivery)  Goal: Successful Maternal Role Transition  Outcome: Ongoing, Progressing     Problem: Bleeding (Postpartum Vaginal Delivery)  Goal: Hemostasis  Outcome: Ongoing, Progressing     Problem: Infection (Postpartum Vaginal Delivery)  Goal: Absence of Infection Signs/Symptoms  Outcome: Ongoing, Progressing     Problem: Pain (Postpartum Vaginal Delivery)  Goal: Acceptable Pain Control  Outcome: Ongoing, Progressing     Problem: Urinary Retention (Postpartum Vaginal Delivery)  Goal: Effective Urinary Elimination  Outcome: Ongoing, Progressing     Problem: Infection  Goal: Infection Symptom Resolution  Outcome: Ongoing, Progressing

## 2020-03-21 NOTE — LACTATION NOTE
This note was copied from a baby's chart.  Oral assessment reveals a thick, fibrous labial frenulum that inserts behind the gingiva, gums notched. Able to elevate lip to nares with blanching at gumline. Infant able to extend tongue past gum line, + lateralization, posterior short, thin frenulum noted. Sucks well on gloved finger, appropriate sucking bursts and pauses. Encouraged pt to discuss with Pediatrician.        03/21/20 0935   Mouth WDL   Mouth WDL gums   Lip Symptoms   (sucking blisters on top and bottle li)   Tongue Symptoms   (thin, short posterior lingual frenulum)   Gum Symptoms   (thick, tight labial frenulum inserts behind the gingiva)   Breastfeeding Session   Breastfeeding Left Side (min) 10 Min  (continues)   Breastfeeding breastfeeding, left side only   Infant Positioning clutch/football   Effective Latch During Feeding yes   Signs of Milk Transfer audible swallow;infant jaw motion present   LATCH Score   Latch 1-->repeated attempts, holds nipple in mouth, stimulate to suck   Audible Swallowing 2-->spontaneous and intermittent (24 hrs old)   Type of Nipple 2-->everted (after stimulation)   Comfort (Breast/Nipple) 2-->soft/nontender   Hold (Positioning) 1-->minimal assist, teach one side, mother does other, staff holds   Score 8

## 2020-03-21 NOTE — PLAN OF CARE
Patient doing well. VS stable. Pain controlled well with oral medication. Patient to follow up in 6 weeks for PP check.

## 2020-03-23 ENCOUNTER — TELEPHONE (OUTPATIENT)
Dept: LACTATION | Facility: CLINIC | Age: 25
End: 2020-03-23

## 2020-03-24 ENCOUNTER — TELEPHONE (OUTPATIENT)
Dept: LACTATION | Facility: CLINIC | Age: 25
End: 2020-03-24

## 2020-03-24 NOTE — TELEPHONE ENCOUNTER
"Follow up phone call.   Pt states "everything fine." baby went to ped and was 5-6 and has had adequate wets/dirty that are yellow in color. Pt has questions regarding umbilical cord falling off.   Pt has no questions or concerns, she has lactation warmline number for support.     "

## 2020-03-25 NOTE — PHYSICIAN QUERY
PT Name: Dannielle De Jesus  MR #: [id number]     Physician Query Form - Documentation Clarification      CDS/: ALIYAH Garcia,[email or web address]        Contact information:[email or web address]    This form is a permanent document in the medical record.     Query Date: 2020    By submitting this query, we are merely seeking further clarification of documentation. Please utilize your independent clinical judgment when addressing the question(s) below.    The Medical record reflects the following:    Supporting Clinical Findings Location in Medical Record   36w4d presents complaining of rupture of membranes at 0830 w/ clear fluid. Denies any contractions.   This IUP is complicated by history of  delivery and limited prenatal care.  Patient denies contractions, denies vaginal bleeding, reports LOF     labor: Yes  Labor Onset Date/Time:2020 08:30    Normal spontaneous vaginal delivery of live infant H&P 3/19                L&D Delivery note 3/20                                                                                        Doctor, Please specify diagnosis or diagnoses associated with above clinical findings.    Provider Use Only      [X] PPROM with onset of  labor within 24 hours  [  ] PPROM with onset of  labor after 24 hours  [  ] PPROM other or unspecified  [  ] Other, please specify:_____________________________________                                                                                                                 [  ] Clinically Undetermined

## 2020-03-30 ENCOUNTER — TELEPHONE (OUTPATIENT)
Dept: OBSTETRICS AND GYNECOLOGY | Facility: OTHER | Age: 25
End: 2020-03-30

## 2020-03-30 NOTE — TELEPHONE ENCOUNTER
She states her pain was at a comfortable level prior to discharge. Pt is taking pain meds still at home as prescribed at time of discharge. Pt has made OB follow-up appointment. Her  has been to their first peds appointment. Pt is still breastfeeding. Pt reports it is going well. She has her mother and significant other helping at home during recovery. Pt rates our discharge process a 10/10 for overall satisfaction. She does know how to contact provider with questions/concerns.    Educated on risks for newborns less than 10 weeks of age to COVID-19. Emphasized importance of social distancing and frequent hand hygiene. Stated to contact OB or pediatrician for further questions/concerns throughout this time.

## 2020-04-03 ENCOUNTER — TELEPHONE (OUTPATIENT)
Dept: PHARMACY | Facility: CLINIC | Age: 25
End: 2020-04-03

## 2020-04-28 ENCOUNTER — TELEPHONE (OUTPATIENT)
Dept: OBSTETRICS AND GYNECOLOGY | Facility: CLINIC | Age: 25
End: 2020-04-28

## 2020-04-28 DIAGNOSIS — Z30.017 ENCOUNTER FOR INITIAL PRESCRIPTION OF IMPLANTABLE SUBDERMAL CONTRACEPTIVE: Primary | ICD-10-CM

## 2020-05-06 ENCOUNTER — POSTPARTUM VISIT (OUTPATIENT)
Dept: OBSTETRICS AND GYNECOLOGY | Facility: CLINIC | Age: 25
End: 2020-05-06
Payer: MEDICAID

## 2020-05-06 VITALS
WEIGHT: 148.13 LBS | DIASTOLIC BLOOD PRESSURE: 82 MMHG | TEMPERATURE: 97 F | HEIGHT: 63 IN | BODY MASS INDEX: 26.25 KG/M2 | SYSTOLIC BLOOD PRESSURE: 110 MMHG

## 2020-05-06 PROCEDURE — 99213 OFFICE O/P EST LOW 20 MIN: CPT | Mod: PBBFAC,PN | Performed by: OBSTETRICS & GYNECOLOGY

## 2020-05-06 PROCEDURE — 99999 PR PBB SHADOW E&M-EST. PATIENT-LVL III: ICD-10-PCS | Mod: PBBFAC,,, | Performed by: OBSTETRICS & GYNECOLOGY

## 2020-05-06 PROCEDURE — 99999 PR PBB SHADOW E&M-EST. PATIENT-LVL III: CPT | Mod: PBBFAC,,, | Performed by: OBSTETRICS & GYNECOLOGY

## 2020-05-06 PROCEDURE — 59430 PR CARE AFTER DELIVERY ONLY: ICD-10-PCS | Mod: ,,, | Performed by: OBSTETRICS & GYNECOLOGY

## 2020-05-06 RX ORDER — NYSTATIN 100000 [USP'U]/ML
5 SUSPENSION ORAL
COMMUNITY

## 2020-05-06 RX ORDER — AMITRIPTYLINE HYDROCHLORIDE 10 MG/1
TABLET, FILM COATED ORAL
COMMUNITY

## 2020-05-06 NOTE — PROGRESS NOTES
Dannielle De Jesus is a 24 y.o. female  presents for a postpartum visit.  She is status post  7 weeks ago.  Her hospitalization was not complicated.  She is breastfeeding.  She desires nexplanon for contraception.  She denies  postpartum depression.    Her last pap was 3/2018    History reviewed. No pertinent past medical history.  History reviewed. No pertinent surgical history.  Review of patient's allergies indicates:  No Known Allergies    Current Outpatient Medications:     HYDROcodone-acetaminophen (NORCO) 5-325 mg per tablet, Take 1 tablet by mouth every 4 (four) hours as needed for Pain., Disp: 12 tablet, Rfl: 0    HYDROcodone-acetaminophen (NORCO) 5-325 mg per tablet, Take 1 tablet by mouth every 6 (six) hours as needed for Pain., Disp: 20 tablet, Rfl: 0    ibuprofen (ADVIL,MOTRIN) 600 MG tablet, Take 1 tablet (600 mg total) by mouth every 6 (six) hours as needed for Pain., Disp: 30 tablet, Rfl: 1    nystatin (MYCOSTATIN) 100,000 unit/mL suspension, 5 mLs., Disp: , Rfl:     amitriptyline (ELAVIL) 10 MG tablet, amitriptyline 10 mg tablet, Disp: , Rfl:     benzocaine-lanolin (DERMOPLAST) 20-0.5 % Aero, Apply topically continuous prn. (Patient not taking: Reported on 2020), Disp: , Rfl:     ondansetron (ZOFRAN-ODT) 4 MG TbDL, Take 1 tablet (4 mg total) by mouth every 6 (six) hours as needed (Nausea). (Patient not taking: Reported on 3/12/2020), Disp: 20 tablet, Rfl: 1    Current Facility-Administered Medications:     HYDROXYprogesterone caproate ([place]) injection 250 mg, 250 mg, Intramuscular, Q7 Days, JAIME Velez MD, 250 mg at 20 1457      Vitals:    20 1106   BP: 110/82   Temp: 97.3 °F (36.3 °C)       GENERAL: healthy, alert, no distress  ABDOMEN: Normal, benign. and no masses, hepatosplenomegaly, no hernias    Assessment:  Normal postpartum exam    Plan:  Routine follow up.

## 2020-05-18 ENCOUNTER — TELEPHONE (OUTPATIENT)
Dept: OBSTETRICS AND GYNECOLOGY | Facility: CLINIC | Age: 25
End: 2020-05-18

## 2020-05-18 NOTE — TELEPHONE ENCOUNTER
Nexplanon received in clinic.     Attempted to call pt to schedule insertion. No answer. Voicemail box full. Will attempt to call at a later time.

## 2020-05-22 ENCOUNTER — TELEPHONE (OUTPATIENT)
Dept: OBSTETRICS AND GYNECOLOGY | Facility: CLINIC | Age: 25
End: 2020-05-22

## 2020-05-26 ENCOUNTER — TELEPHONE (OUTPATIENT)
Dept: OBSTETRICS AND GYNECOLOGY | Facility: CLINIC | Age: 25
End: 2020-05-26

## 2020-05-26 NOTE — TELEPHONE ENCOUNTER
Nexplanon received in clinic.     No answer. Message left to call clinic back.     [facility] message also sent.

## 2020-06-18 ENCOUNTER — TELEPHONE (OUTPATIENT)
Dept: OBSTETRICS AND GYNECOLOGY | Facility: CLINIC | Age: 25
End: 2020-06-18

## 2020-06-18 NOTE — TELEPHONE ENCOUNTER
No answer. Voicemail box full. Unable to leave message.    Nexplanon device in clinic. Insertion needs to be schedule.d

## 2020-06-22 PROBLEM — Z37.9 NORMAL LABOR: Status: RESOLVED | Noted: 2020-03-19 | Resolved: 2020-06-22

## 2020-07-15 ENCOUNTER — TELEPHONE (OUTPATIENT)
Dept: OBSTETRICS AND GYNECOLOGY | Facility: CLINIC | Age: 25
End: 2020-07-15

## 2020-07-15 NOTE — TELEPHONE ENCOUNTER
Attempted to call pt to schedule from appt request, no answer, unable to leave voicemail.. voicemail full

## 2020-07-31 ENCOUNTER — PROCEDURE VISIT (OUTPATIENT)
Dept: OBSTETRICS AND GYNECOLOGY | Facility: CLINIC | Age: 25
End: 2020-07-31
Payer: MEDICAID

## 2020-07-31 VITALS
SYSTOLIC BLOOD PRESSURE: 108 MMHG | BODY MASS INDEX: 27.24 KG/M2 | WEIGHT: 153.75 LBS | DIASTOLIC BLOOD PRESSURE: 64 MMHG

## 2020-07-31 DIAGNOSIS — Z30.017 NEXPLANON INSERTION: Primary | ICD-10-CM

## 2020-07-31 LAB
B-HCG UR QL: NEGATIVE
CTP QC/QA: YES

## 2020-07-31 PROCEDURE — 11981 INSERTION OF NEXPLANON: ICD-10-PCS | Mod: S$PBB,,, | Performed by: NURSE PRACTITIONER

## 2020-07-31 PROCEDURE — 11981 INSERTION DRUG DLVR IMPLANT: CPT | Mod: S$PBB,,, | Performed by: NURSE PRACTITIONER

## 2020-07-31 PROCEDURE — 11981 INSERTION DRUG DLVR IMPLANT: CPT | Mod: PBBFAC,PN | Performed by: NURSE PRACTITIONER

## 2020-07-31 RX ADMIN — ETONOGESTREL 68 MG: 68 IMPLANT SUBCUTANEOUS at 11:07

## 2020-07-31 NOTE — PROCEDURES
Insertion of Nexplanon    Date/Time: 7/31/2020 11:00 AM  Performed by: RAMIRO Greer  Authorized by: RAMIRO Greer     Consent obtained:  Written  Consent given by:  Patient  Patient questions answered: yes    Patient agrees, verbalizes understanding, and wants to proceed: yes    Pre-procedure timeout performed: yes    Prepped with: povidone-iodine    Local anesthetic:  Lidocaine with epinephrine  The site was cleaned and prepped in a sterile fashion: yes    Left/right:  Left   68 mg etonogestrel 68 mg  Preloaded Implanon trocar was placed subdermally: yes    Visualization of implant was obtained: yes    Nexplanon was inserted and trocar removed: yes    Visualization of notch in stilette and palpitation of device: yes    Palpitation confirms placement by provider and patient: yes    Site was closed with steri-strips and pressure bandage applied: yes

## 2021-02-04 NOTE — PROGRESS NOTES
Good fetal movement.  Occasional contractions, no vaginal bleeding, and no loss of fluid.  Reports constant pelvic pressure.  Cervical exam: 3/40/-1.  Suspect pain is due to -1 station.  Discussed labor precautions.     stated

## 2021-03-01 PROBLEM — Z3A.36 36 WEEKS GESTATION OF PREGNANCY: Status: RESOLVED | Noted: 2020-03-19 | Resolved: 2021-03-01

## 2021-04-16 ENCOUNTER — PATIENT MESSAGE (OUTPATIENT)
Dept: RESEARCH | Facility: HOSPITAL | Age: 26
End: 2021-04-16

## 2021-06-23 ENCOUNTER — TELEPHONE (OUTPATIENT)
Dept: OBSTETRICS AND GYNECOLOGY | Facility: CLINIC | Age: 26
End: 2021-06-23